# Patient Record
Sex: MALE | Race: WHITE | NOT HISPANIC OR LATINO | Employment: FULL TIME | URBAN - METROPOLITAN AREA
[De-identification: names, ages, dates, MRNs, and addresses within clinical notes are randomized per-mention and may not be internally consistent; named-entity substitution may affect disease eponyms.]

---

## 2023-03-11 LAB
ALANINE AMINOTRANSFERASE (SGPT) (U/L) IN SER/PLAS: 26 U/L (ref 10–52)
ALBUMIN (G/DL) IN SER/PLAS: 4.4 G/DL (ref 3.4–5)
ALKALINE PHOSPHATASE (U/L) IN SER/PLAS: 38 U/L (ref 33–120)
ANION GAP IN SER/PLAS: 11 MMOL/L (ref 10–20)
ASPARTATE AMINOTRANSFERASE (SGOT) (U/L) IN SER/PLAS: 23 U/L (ref 9–39)
BILIRUBIN TOTAL (MG/DL) IN SER/PLAS: 0.5 MG/DL (ref 0–1.2)
CALCIUM (MG/DL) IN SER/PLAS: 9.5 MG/DL (ref 8.6–10.3)
CARBON DIOXIDE, TOTAL (MMOL/L) IN SER/PLAS: 30 MMOL/L (ref 21–32)
CHLORIDE (MMOL/L) IN SER/PLAS: 99 MMOL/L (ref 98–107)
CHOLESTEROL (MG/DL) IN SER/PLAS: 169 MG/DL (ref 0–199)
CHOLESTEROL IN HDL (MG/DL) IN SER/PLAS: 54.9 MG/DL
CHOLESTEROL/HDL RATIO: 3.1
CREATININE (MG/DL) IN SER/PLAS: 0.87 MG/DL (ref 0.5–1.3)
GFR MALE: >90 ML/MIN/1.73M2
GLUCOSE (MG/DL) IN SER/PLAS: 95 MG/DL (ref 74–99)
LDL: 98 MG/DL (ref 0–99)
POTASSIUM (MMOL/L) IN SER/PLAS: 4.3 MMOL/L (ref 3.5–5.3)
PROTEIN TOTAL: 7.4 G/DL (ref 6.4–8.2)
SODIUM (MMOL/L) IN SER/PLAS: 136 MMOL/L (ref 136–145)
TRIGLYCERIDE (MG/DL) IN SER/PLAS: 83 MG/DL (ref 0–149)
UREA NITROGEN (MG/DL) IN SER/PLAS: 13 MG/DL (ref 6–23)
VLDL: 17 MG/DL (ref 0–40)

## 2023-06-10 LAB
ALANINE AMINOTRANSFERASE (SGPT) (U/L) IN SER/PLAS: 23 U/L (ref 10–52)
ALBUMIN (G/DL) IN SER/PLAS: 4.5 G/DL (ref 3.4–5)
ALKALINE PHOSPHATASE (U/L) IN SER/PLAS: 44 U/L (ref 33–120)
ANION GAP IN SER/PLAS: 13 MMOL/L (ref 10–20)
ASPARTATE AMINOTRANSFERASE (SGOT) (U/L) IN SER/PLAS: 21 U/L (ref 9–39)
BILIRUBIN TOTAL (MG/DL) IN SER/PLAS: 0.7 MG/DL (ref 0–1.2)
CALCIUM (MG/DL) IN SER/PLAS: 9.5 MG/DL (ref 8.6–10.3)
CARBON DIOXIDE, TOTAL (MMOL/L) IN SER/PLAS: 29 MMOL/L (ref 21–32)
CHLORIDE (MMOL/L) IN SER/PLAS: 101 MMOL/L (ref 98–107)
CHOLESTEROL (MG/DL) IN SER/PLAS: 187 MG/DL (ref 0–199)
CHOLESTEROL IN HDL (MG/DL) IN SER/PLAS: 70 MG/DL
CHOLESTEROL/HDL RATIO: 2.7
CREATININE (MG/DL) IN SER/PLAS: 0.98 MG/DL (ref 0.5–1.3)
ERYTHROCYTE DISTRIBUTION WIDTH (RATIO) BY AUTOMATED COUNT: 12.5 % (ref 11.5–14.5)
ERYTHROCYTE MEAN CORPUSCULAR HEMOGLOBIN CONCENTRATION (G/DL) BY AUTOMATED: 32.8 G/DL (ref 32–36)
ERYTHROCYTE MEAN CORPUSCULAR VOLUME (FL) BY AUTOMATED COUNT: 98 FL (ref 80–100)
ERYTHROCYTES (10*6/UL) IN BLOOD BY AUTOMATED COUNT: 4.55 X10E12/L (ref 4.5–5.9)
GFR MALE: 88 ML/MIN/1.73M2
GLUCOSE (MG/DL) IN SER/PLAS: 105 MG/DL (ref 74–99)
HEMATOCRIT (%) IN BLOOD BY AUTOMATED COUNT: 44.8 % (ref 41–52)
HEMOGLOBIN (G/DL) IN BLOOD: 14.7 G/DL (ref 13.5–17.5)
LDL: 106 MG/DL (ref 0–99)
LEUKOCYTES (10*3/UL) IN BLOOD BY AUTOMATED COUNT: 7.1 X10E9/L (ref 4.4–11.3)
PLATELETS (10*3/UL) IN BLOOD AUTOMATED COUNT: 353 X10E9/L (ref 150–450)
POTASSIUM (MMOL/L) IN SER/PLAS: 4.5 MMOL/L (ref 3.5–5.3)
PROSTATE SPECIFIC AG (NG/ML) IN SER/PLAS: 0.24 NG/ML (ref 0–4)
PROTEIN TOTAL: 7.4 G/DL (ref 6.4–8.2)
SODIUM (MMOL/L) IN SER/PLAS: 138 MMOL/L (ref 136–145)
TRIGLYCERIDE (MG/DL) IN SER/PLAS: 55 MG/DL (ref 0–149)
UREA NITROGEN (MG/DL) IN SER/PLAS: 19 MG/DL (ref 6–23)
VLDL: 11 MG/DL (ref 0–40)

## 2023-10-21 DIAGNOSIS — E78.5 HYPERLIPIDEMIA, UNSPECIFIED HYPERLIPIDEMIA TYPE: Primary | ICD-10-CM

## 2023-10-21 DIAGNOSIS — I50.9 HEART FAILURE, UNSPECIFIED (MULTI): ICD-10-CM

## 2023-10-23 RX ORDER — PRAVASTATIN SODIUM 20 MG/1
20 TABLET ORAL DAILY
Qty: 90 TABLET | Refills: 1 | Status: SHIPPED | OUTPATIENT
Start: 2023-10-23 | End: 2024-04-01

## 2023-11-17 ENCOUNTER — TELEPHONE (OUTPATIENT)
Dept: CARDIOLOGY | Facility: CLINIC | Age: 58
End: 2023-11-17
Payer: COMMERCIAL

## 2023-11-17 RX ORDER — FLECAINIDE ACETATE 100 MG/1
100 TABLET ORAL 2 TIMES DAILY
Qty: 180 TABLET | Refills: 3 | Status: SHIPPED | OUTPATIENT
Start: 2023-11-17 | End: 2023-12-07 | Stop reason: SDUPTHER

## 2023-11-17 NOTE — TELEPHONE ENCOUNTER
Rx Refill Request Telephone Encounter    Name:  Usman Cota  :  [id number]  Medication Name: flecainide  Specific Pharmacy location:  [facility]  Date of last appointment:  23  Date of next appointment:  24  Best number to reach patient:  [phone]

## 2023-12-07 ENCOUNTER — TELEPHONE (OUTPATIENT)
Dept: CARDIOLOGY | Facility: CLINIC | Age: 58
End: 2023-12-07
Payer: COMMERCIAL

## 2023-12-07 DIAGNOSIS — I50.9 HEART FAILURE, UNSPECIFIED (MULTI): ICD-10-CM

## 2023-12-08 RX ORDER — FLECAINIDE ACETATE 100 MG/1
100 TABLET ORAL 2 TIMES DAILY
Qty: 180 TABLET | Refills: 1 | Status: SHIPPED | OUTPATIENT
Start: 2023-12-08

## 2024-01-20 DIAGNOSIS — I10 ESSENTIAL (PRIMARY) HYPERTENSION: ICD-10-CM

## 2024-01-22 RX ORDER — METOPROLOL TARTRATE 50 MG/1
TABLET ORAL
Qty: 315 TABLET | Refills: 3 | Status: SHIPPED | OUTPATIENT
Start: 2024-01-22

## 2024-01-22 NOTE — TELEPHONE ENCOUNTER
Received pharmacy refill request for metoprolol tartrate. Order placed and sent to physician for signature.

## 2024-01-24 PROBLEM — I45.10 RIGHT BUNDLE BRANCH BLOCK (RBBB): Status: ACTIVE | Noted: 2024-01-24

## 2024-01-24 PROBLEM — I48.0 PAROXYSMAL ATRIAL FIBRILLATION (MULTI): Status: ACTIVE | Noted: 2024-01-24

## 2024-01-24 PROBLEM — I50.9 CONGESTIVE HEART FAILURE (MULTI): Status: ACTIVE | Noted: 2024-01-24

## 2024-01-24 PROBLEM — R00.0 SINUS TACHYCARDIA: Status: ACTIVE | Noted: 2024-01-24

## 2024-01-24 PROBLEM — N52.9 ERECTILE DYSFUNCTION: Status: ACTIVE | Noted: 2024-01-24

## 2024-01-24 PROBLEM — E66.3 OVERWEIGHT WITH BODY MASS INDEX (BMI) OF 27 TO 27.9 IN ADULT: Status: ACTIVE | Noted: 2024-01-24

## 2024-01-24 PROBLEM — E78.2 MIXED HYPERLIPIDEMIA: Status: ACTIVE | Noted: 2024-01-24

## 2024-01-24 PROBLEM — E78.1 ESSENTIAL HYPERTRIGLYCERIDEMIA: Status: ACTIVE | Noted: 2024-01-24

## 2024-01-24 RX ORDER — GEMFIBROZIL 600 MG/1
600 TABLET, FILM COATED ORAL DAILY
COMMUNITY
End: 2024-04-01

## 2024-01-24 RX ORDER — LISINOPRIL 10 MG/1
10 TABLET ORAL DAILY
COMMUNITY
End: 2024-04-01

## 2024-01-24 RX ORDER — DILTIAZEM HYDROCHLORIDE 360 MG/1
360 CAPSULE, EXTENDED RELEASE ORAL DAILY
Qty: 90 CAPSULE | Refills: 3 | Status: SHIPPED | OUTPATIENT
Start: 2024-01-24

## 2024-01-24 RX ORDER — DILTIAZEM HYDROCHLORIDE 360 MG/1
360 CAPSULE, EXTENDED RELEASE ORAL DAILY
COMMUNITY
Start: 2023-10-22 | End: 2024-01-24 | Stop reason: SDUPTHER

## 2024-02-01 ENCOUNTER — OFFICE VISIT (OUTPATIENT)
Dept: ORTHOPEDIC SURGERY | Facility: CLINIC | Age: 59
End: 2024-02-01
Payer: COMMERCIAL

## 2024-02-01 ENCOUNTER — HOSPITAL ENCOUNTER (OUTPATIENT)
Dept: RADIOLOGY | Facility: CLINIC | Age: 59
Discharge: HOME | End: 2024-02-01
Payer: COMMERCIAL

## 2024-02-01 DIAGNOSIS — M25.512 BILATERAL SHOULDER PAIN, UNSPECIFIED CHRONICITY: ICD-10-CM

## 2024-02-01 DIAGNOSIS — M25.511 BILATERAL SHOULDER PAIN, UNSPECIFIED CHRONICITY: ICD-10-CM

## 2024-02-01 PROCEDURE — 99213 OFFICE O/P EST LOW 20 MIN: CPT | Performed by: ORTHOPAEDIC SURGERY

## 2024-02-01 PROCEDURE — 20610 DRAIN/INJ JOINT/BURSA W/O US: CPT | Mod: LT | Performed by: ORTHOPAEDIC SURGERY

## 2024-02-01 PROCEDURE — 2500000005 HC RX 250 GENERAL PHARMACY W/O HCPCS: Performed by: ORTHOPAEDIC SURGERY

## 2024-02-01 PROCEDURE — 73030 X-RAY EXAM OF SHOULDER: CPT | Mod: 50

## 2024-02-01 PROCEDURE — 73030 X-RAY EXAM OF SHOULDER: CPT | Mod: BILATERAL PROCEDURE | Performed by: ORTHOPAEDIC SURGERY

## 2024-02-01 PROCEDURE — 99203 OFFICE O/P NEW LOW 30 MIN: CPT | Performed by: ORTHOPAEDIC SURGERY

## 2024-02-01 PROCEDURE — 2500000004 HC RX 250 GENERAL PHARMACY W/ HCPCS (ALT 636 FOR OP/ED): Performed by: ORTHOPAEDIC SURGERY

## 2024-02-01 RX ORDER — TRIAMCINOLONE ACETONIDE 40 MG/ML
40 INJECTION, SUSPENSION INTRA-ARTICULAR; INTRAMUSCULAR
Status: COMPLETED | OUTPATIENT
Start: 2024-02-01 | End: 2024-02-01

## 2024-02-01 RX ORDER — LIDOCAINE HYDROCHLORIDE 10 MG/ML
2 INJECTION INFILTRATION; PERINEURAL
Status: COMPLETED | OUTPATIENT
Start: 2024-02-01 | End: 2024-02-01

## 2024-02-01 RX ORDER — MELOXICAM 15 MG/1
15 TABLET ORAL DAILY
Qty: 30 TABLET | Refills: 0 | Status: SHIPPED | OUTPATIENT
Start: 2024-02-01 | End: 2024-03-02

## 2024-02-01 RX ADMIN — TRIAMCINOLONE ACETONIDE 40 MG: 40 INJECTION, SUSPENSION INTRA-ARTICULAR; INTRAMUSCULAR at 16:41

## 2024-02-01 RX ADMIN — LIDOCAINE HYDROCHLORIDE 2 ML: 10 INJECTION, SOLUTION INFILTRATION; PERINEURAL at 16:41

## 2024-02-01 NOTE — PROGRESS NOTES
History of Present Illness:  Patient presents today endorsing bilateral shoulder pain.  The pain is worse with overhead activity and tends to wake the patient at night.  The patient denies a traumatic injury.  The pain is sharp in nature, localizes lateral and deep.  Better with rest.    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  He is very physically active and outside of work and enjoys weightlifting.  States that bench press and  press do bother him quite a bit.    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  He has Afib but is currently managing with rate control, no blood thinners.    Past Medical History:   Diagnosis Date    Abdominal distension (gaseous)     Abdominal bloating    Body mass index (BMI) 26.0-26.9, adult 01/27/2022    BMI 26.0-26.9,adult    Overweight     Overweight    Personal history of other specified conditions     History of diarrhea    Personal history of other specified conditions     History of disease    Personal history of other specified conditions     History of chest pain    Right upper quadrant pain     Chronic RUQ pain     Past Surgical History:   Procedure Laterality Date    OTHER SURGICAL HISTORY  01/18/2022    Dental surgery       Current Outpatient Medications:     dilTIAZem CD (Cardizem CD) 360 mg 24 hr capsule, TAKE 1 CAPSULE DAILY, Disp: 90 capsule, Rfl: 3    flecainide (Tambocor) 100 mg tablet, Take 1 tablet (100 mg) by mouth 2 times a day., Disp: 180 tablet, Rfl: 1    gemfibrozil (Lopid) 600 mg tablet, Take 1 tablet (600 mg) by mouth once daily., Disp: , Rfl:     lisinopril 10 mg tablet, Take 1 tablet (10 mg) by mouth once daily., Disp: , Rfl:     metoprolol tartrate (Lopressor) 50 mg tablet, TAKE 1 TABLET IN THE AM, 2 TABLETS IN THE PM, Disp: 315 tablet, Rfl: 3    pravastatin (Pravachol) 20 mg tablet, TAKE 1 TABLET BY MOUTH EVERY DAY, Disp: 90 tablet, Rfl:  1    Review of Systems   GENERAL: Negative for malaise, significant weight loss, fever  MUSCULOSKELETAL: see HPI  NEURO:  Negative    Physical Examination:  Bilateral Shoulder:    Skin healthy to gross inspection  No ecchymosis, no swelling, no gross atrophy  No tenderness to palpation over acromioclavicular joint  No tenderness to palpation over biceps tendon  No tenderness to palpation over the cervical spine     Range of motion:  Full forward flexion  Full external rotation  IR to thoracic spine, symmetric to contralateral side  Strength:  4+/5 Resisted elevation  5/5 Resisted external rotation    Negative lift off test   Negative Spurling´s test  Positive Neer and Hawkin´s test  (+) Obriens test  Neurovascular exam normal distally    Imaging:  Radiographs demonstrate healthy joint spaces with no evidence of significant degenerative changes or fractures.  Mild left humeral head depression, could be angle of radiograph.  Right shoulder there is a small inferior glenohumeral osteophyte that could represent loose body.  Mild AC arthrosis bilaterally.    Assessment:  Patient with bilateral shoulder pain, impingement versus rotator cuff pathology , likely a component of SLAP tear as well    Plan:  We discussed external impingement - reviewing acromial morphology and rotator cuff pathology.  The possibility of a partial or full-thickness or partial rotator cuff tear was discussed.  We discussed non-operative treatments (physical therapy, NSAIDs/risks, corticosteroid injections, and activity medication) and operative treatments (shoulder arthroscopy, acromioplasty, associated interventions, risks and benefits).  The patient elected for CSI to bilateral shoulder today for impingement.  We also discussed possible MRI in the future if he fails to improve after steroid injections for evaluation of soft tissue structures such as rotator cuff and labrum.    Wes Echavarria PA-C     In a face to face encounter, I evaluated  the patient and performed a physical examination, discussed pertinent diagnostic studies if indicated and discussed diagnosis and management strategies with both the patient and physician assistant / nurse practitioner.  I reviewed the PA/NP's note and agree with the documented findings and plan of care.    L Inj/Asp: L subacromial bursa on 2/1/2024 4:41 PM  Indications: pain  Details: 22 G needle, posterior approach  Medications: 2 mL lidocaine 10 mg/mL (1 %); 40 mg triamcinolone acetonide 40 mg/mL  Outcome: tolerated well, no immediate complications  Procedure, treatment alternatives, risks and benefits explained, specific risks discussed. Consent was given by the patient. Immediately prior to procedure a time out was called to verify the correct patient, procedure, equipment, support staff and site/side marked as required. Patient was prepped and draped in the usual sterile fashion.         MRI if he fails to improve.    Darshan Rosas MD

## 2024-02-08 ENCOUNTER — OFFICE VISIT (OUTPATIENT)
Dept: CARDIOLOGY | Facility: CLINIC | Age: 59
End: 2024-02-08
Payer: COMMERCIAL

## 2024-02-08 VITALS
HEART RATE: 67 BPM | SYSTOLIC BLOOD PRESSURE: 138 MMHG | WEIGHT: 196 LBS | HEIGHT: 72 IN | BODY MASS INDEX: 26.55 KG/M2 | DIASTOLIC BLOOD PRESSURE: 80 MMHG

## 2024-02-08 DIAGNOSIS — I45.10 RIGHT BUNDLE BRANCH BLOCK (RBBB): ICD-10-CM

## 2024-02-08 DIAGNOSIS — I10 ESSENTIAL HYPERTENSION: ICD-10-CM

## 2024-02-08 DIAGNOSIS — E78.2 MIXED HYPERLIPIDEMIA: ICD-10-CM

## 2024-02-08 DIAGNOSIS — E66.3 OVERWEIGHT (BMI 25.0-29.9): ICD-10-CM

## 2024-02-08 DIAGNOSIS — I50.9 CONGESTIVE HEART FAILURE, UNSPECIFIED HF CHRONICITY, UNSPECIFIED HEART FAILURE TYPE (MULTI): ICD-10-CM

## 2024-02-08 DIAGNOSIS — I48.0 PAROXYSMAL ATRIAL FIBRILLATION (MULTI): ICD-10-CM

## 2024-02-08 PROCEDURE — 99214 OFFICE O/P EST MOD 30 MIN: CPT | Performed by: INTERNAL MEDICINE

## 2024-02-08 PROCEDURE — 3075F SYST BP GE 130 - 139MM HG: CPT | Performed by: INTERNAL MEDICINE

## 2024-02-08 PROCEDURE — 3079F DIAST BP 80-89 MM HG: CPT | Performed by: INTERNAL MEDICINE

## 2024-02-08 ASSESSMENT — ENCOUNTER SYMPTOMS
CARDIOVASCULAR NEGATIVE: 1
CONSTITUTIONAL NEGATIVE: 1
RESPIRATORY NEGATIVE: 1
NEUROLOGICAL NEGATIVE: 1

## 2024-02-08 ASSESSMENT — PATIENT HEALTH QUESTIONNAIRE - PHQ9
2. FEELING DOWN, DEPRESSED OR HOPELESS: NOT AT ALL
SUM OF ALL RESPONSES TO PHQ9 QUESTIONS 1 AND 2: 0
1. LITTLE INTEREST OR PLEASURE IN DOING THINGS: NOT AT ALL

## 2024-02-08 NOTE — PROGRESS NOTES
[facility] OFFICE VISIT      CHIEF COMPLAINT  Chief Complaint   Patient presents with    Follow-up        HISTORY OF PRESENT ILLNESS  Usman Cota is a 58 y.o. year old male patient with a history of hypertension, paroxysmal atrial fibrillation and a chronic right bundle branch block who has continued to maintain sinus rhythm on flecainide.  Last echo shows normal LV function with no gross valvular abnormalities.  He had moderately elevated triglyceride which is significantly improved and is recent lipid profile from June 2023 which shows total cholesterol is 187, HDL is 70, LDL is 106 and triglyceride is 57.  He has continued to stay active with no significant limitations    ASSESSMENT AND PLAN  1.  Paroxysmal A-fib: Continues to maintain sinus rhythm with no further recurrences of A-fib.  He is on low-dose aspirin.  We will consider reevaluation of his flecainide dosing when he returns for follow-up in 6 months  2.  Hypertension: Blood pressure is well-controlled current medications which are continued.  3.  Chronic right bundle branch block: This appears to be relatively stable and asymptomatic.  4.  Dyslipidemia: With improved lipid profile as noted above, continue with current lipid-lowering therapy.    Problem List Items Addressed This Visit             ICD-10-CM    Essential hypertension I10    Mixed hyperlipidemia E78.2    Paroxysmal atrial fibrillation (CMS/HCC) I48.0    Right bundle branch block (RBBB) I45.10    Congestive heart failure (CMS/HCC) I50.9    Overweight (BMI 25.0-29.9) E66.3       Recent Cardiovascular Testing:    Echo-  Stress-  Cath-  Carotid Ultrasound-    Past Medical History  Past Medical History:   Diagnosis Date    Abdominal distension (gaseous)     Abdominal bloating    Body mass index (BMI) 26.0-26.9, adult 01/27/2022    BMI 26.0-26.9,adult    Overweight     Overweight    Personal history of other specified conditions     History of diarrhea    Personal history of other  "specified conditions     History of disease    Personal history of other specified conditions     History of chest pain    Right upper quadrant pain     Chronic RUQ pain       Social History  Social History     Tobacco Use    Smoking status: Not on file    Smokeless tobacco: Not on file   Substance Use Topics    Alcohol use: Not on file    Drug use: Not on file       Family History     Family History   Problem Relation Name Age of Onset    Pancreatic cancer Father      Stroke Maternal Grandfather      Heart attack Paternal Grandfather          Allergies:  No Known Allergies     Outpatient Medications:  Current Outpatient Medications   Medication Instructions    dilTIAZem CD (CARDIZEM CD) 360 mg, oral, Daily    flecainide (TAMBOCOR) 100 mg, oral, 2 times daily    gemfibrozil (LOPID) 600 mg, oral, Daily    lisinopril 10 mg, oral, Daily    meloxicam (MOBIC) 15 mg, oral, Daily    metoprolol tartrate (Lopressor) 50 mg tablet TAKE 1 TABLET IN THE AM, 2 TABLETS IN THE PM    pravastatin (PRAVACHOL) 20 mg, oral, Daily        Recent Lab Results:    CBC:   Lab Results   Component Value Date    WBC 7.1 06/10/2023    RBC 4.55 06/10/2023    HGB 14.7 06/10/2023    HCT 44.8 06/10/2023     06/10/2023        CMP:    Lab Results   Component Value Date     06/10/2023    K 4.5 06/10/2023     06/10/2023    CO2 29 06/10/2023    BUN 19 06/10/2023    CREATININE 0.98 06/10/2023    GLUCOSE 105 (H) 06/10/2023    CALCIUM 9.5 06/10/2023       Magnesium:    No results found for: \"MG\"    Lipid Profile:    Lab Results   Component Value Date    TRIG 55 06/10/2023    HDL 70.0 06/10/2023       TSH:    Lab Results   Component Value Date    TSH 2.61 02/08/2022       BNP:   No results found for: \"BNP\"     PT/INR:    No results found for: \"PROTIME\", \"INR\"    HgBA1c:    No results found for: \"HGBA1C\"    BMP:  Lab Results   Component Value Date     06/10/2023     03/11/2023     02/08/2022    K 4.5 06/10/2023    K 4.3 " "03/11/2023    K 4.1 02/08/2022     06/10/2023    CL 99 03/11/2023     02/08/2022    CO2 29 06/10/2023    CO2 30 03/11/2023    CO2 28 02/08/2022    BUN 19 06/10/2023    BUN 13 03/11/2023    BUN 12 02/08/2022    CREATININE 0.98 06/10/2023    CREATININE 0.87 03/11/2023    CREATININE 0.76 02/08/2022       CBC:  Lab Results   Component Value Date    WBC 7.1 06/10/2023    WBC 6.8 02/08/2022    WBC 7.4 05/21/2019    RBC 4.55 06/10/2023    RBC 4.66 02/08/2022    RBC 4.19 (L) 05/21/2019    HGB 14.7 06/10/2023    HGB 14.5 02/08/2022    HGB 13.9 05/21/2019    HCT 44.8 06/10/2023    HCT 46.2 02/08/2022    HCT 41.9 05/21/2019    MCV 98 06/10/2023    MCV 99 02/08/2022     05/21/2019    MCHC 32.8 06/10/2023    MCHC 31.4 (L) 02/08/2022    MCHC 33.2 05/21/2019    RDW 12.5 06/10/2023    RDW 12.8 02/08/2022    RDW 12.3 05/21/2019     06/10/2023     02/08/2022     05/21/2019       Cardiac Enzymes:    No results found for: \"TROPHS\"    Hepatic Function Panel:    Lab Results   Component Value Date    ALKPHOS 44 06/10/2023    ALT 23 06/10/2023    AST 21 06/10/2023    PROT 7.4 06/10/2023    BILITOT 0.7 06/10/2023    BILIDIR 0.1 04/16/2021         REVIEW OF SYSTEMS  Review of Systems   Constitutional: Negative.   Cardiovascular: Negative.    Respiratory: Negative.     Neurological: Negative.        VITALS  Vitals:    02/08/24 1650   BP: 138/80   Pulse: 67     Wt Readings from Last 4 Encounters:   02/08/24 88.9 kg (196 lb)   04/18/23 89.6 kg (197 lb 8 oz)   01/12/23 86.6 kg (191 lb)   05/26/22 84.4 kg (186 lb)       PHYSICAL EXAM  Constitutional:       Appearance: Healthy appearance.      Comments: overweight   Neck:      Vascular: JVD normal.   Pulmonary:      Effort: Pulmonary effort is normal.      Breath sounds: Normal breath sounds.   Cardiovascular:      Normal rate. Regular rhythm.      Murmurs: There is no murmur.   Edema:     Peripheral edema absent.   Skin:     General: Skin is warm and dry. "   Neurological:      Mental Status: Alert and oriented to person, place and time.         Scribe Attestation  By signing my name below, Soledad THOMPSON LPN  , Scribe   attest that this documentation has been prepared under the direction and in the presence of Diomedes Giles MD.

## 2024-03-09 ENCOUNTER — LAB (OUTPATIENT)
Dept: LAB | Facility: LAB | Age: 59
End: 2024-03-09
Payer: COMMERCIAL

## 2024-03-09 DIAGNOSIS — I10 ESSENTIAL HYPERTENSION: ICD-10-CM

## 2024-03-09 DIAGNOSIS — I50.9 CONGESTIVE HEART FAILURE, UNSPECIFIED HF CHRONICITY, UNSPECIFIED HEART FAILURE TYPE (MULTI): ICD-10-CM

## 2024-03-09 DIAGNOSIS — E78.2 MIXED HYPERLIPIDEMIA: ICD-10-CM

## 2024-03-09 LAB
ALBUMIN SERPL BCP-MCNC: 4.3 G/DL (ref 3.4–5)
ALP SERPL-CCNC: 33 U/L (ref 33–136)
ALT SERPL W P-5'-P-CCNC: 16 U/L (ref 10–52)
ANION GAP SERPL CALC-SCNC: 11 MMOL/L (ref 10–20)
AST SERPL W P-5'-P-CCNC: 15 U/L (ref 9–39)
BILIRUB SERPL-MCNC: 0.6 MG/DL (ref 0–1.2)
BNP SERPL-MCNC: 59 PG/ML (ref 0–99)
BUN SERPL-MCNC: 13 MG/DL (ref 6–23)
CALCIUM SERPL-MCNC: 9.4 MG/DL (ref 8.6–10.3)
CHLORIDE SERPL-SCNC: 102 MMOL/L (ref 98–107)
CHOLEST SERPL-MCNC: 195 MG/DL (ref 0–199)
CHOLESTEROL/HDL RATIO: 2.6
CO2 SERPL-SCNC: 29 MMOL/L (ref 21–32)
CREAT SERPL-MCNC: 0.95 MG/DL (ref 0.5–1.3)
EGFRCR SERPLBLD CKD-EPI 2021: >90 ML/MIN/1.73M*2
GLUCOSE SERPL-MCNC: 93 MG/DL (ref 74–99)
HDLC SERPL-MCNC: 74.1 MG/DL
LDLC SERPL CALC-MCNC: 100 MG/DL
NON HDL CHOLESTEROL: 121 MG/DL (ref 0–149)
POTASSIUM SERPL-SCNC: 4.6 MMOL/L (ref 3.5–5.3)
PROT SERPL-MCNC: 6.8 G/DL (ref 6.4–8.2)
SODIUM SERPL-SCNC: 137 MMOL/L (ref 136–145)
TRIGL SERPL-MCNC: 103 MG/DL (ref 0–149)
VLDL: 21 MG/DL (ref 0–40)

## 2024-03-09 PROCEDURE — 36415 COLL VENOUS BLD VENIPUNCTURE: CPT

## 2024-03-09 PROCEDURE — 80053 COMPREHEN METABOLIC PANEL: CPT

## 2024-03-09 PROCEDURE — 80061 LIPID PANEL: CPT

## 2024-03-09 PROCEDURE — 83880 ASSAY OF NATRIURETIC PEPTIDE: CPT

## 2024-03-14 ENCOUNTER — OFFICE VISIT (OUTPATIENT)
Dept: ORTHOPEDIC SURGERY | Facility: CLINIC | Age: 59
End: 2024-03-14
Payer: COMMERCIAL

## 2024-03-14 DIAGNOSIS — M25.511 BILATERAL SHOULDER PAIN, UNSPECIFIED CHRONICITY: ICD-10-CM

## 2024-03-14 DIAGNOSIS — M25.512 BILATERAL SHOULDER PAIN, UNSPECIFIED CHRONICITY: ICD-10-CM

## 2024-03-14 DIAGNOSIS — S46.219A BICEPS TENDON TEAR: ICD-10-CM

## 2024-03-14 PROCEDURE — 99213 OFFICE O/P EST LOW 20 MIN: CPT | Performed by: ORTHOPAEDIC SURGERY

## 2024-03-14 NOTE — PROGRESS NOTES
History of Present Illness:   Patient known to me with bilateral shoulder pain and recently had some injections done.  He was doing well but when doing some lifting recently he noted some significant pain felt a pop in the shoulder and has some deformity now.    He states the shoulder pain is somewhat improved has some mild cramping pain in his biceps.    Review of Systems   GENERAL: Negative for malaise, significant weight loss, fever  MUSCULOSKELETAL: see HPI  NEURO:  Negative    Physical Examination:  Right Shoulder:    Skin healthy to gross inspection  No ecchymosis, no swelling, no gross atrophy  No tenderness to palpation over acromioclavicular joint  No tenderness to palpation over biceps tendon  No tenderness to palpation over the cervical spine     ROM:  Full forward flexion  Full external rotation  IR to thoracic spine, symmetric to contralateral side  Strength:  5-/5 Resisted elevation  5/5 Resisted external rotation  Negative lift off test   Negative Spurling´s test  Positive Neer and Hawking´s test  Neurovascular exam normal distally      Imaging:  Deferred    Assessment:   Patient with likely long head of biceps rupture in the setting of concern for impingement/rotator cuff pathology    Plan:  We discussed with the patient based on the deformity and the recent injury recommend MRI done efficiently for further evaluation.  We discussed surgical versus nonsurgical management for a long head of biceps tears and the patient is certainly amenable to nonsurgical management however due to concern for concomitant rotator cuff pathology that might change treatment plan.  Will contact him with the results of the study.

## 2024-03-20 ENCOUNTER — HOSPITAL ENCOUNTER (OUTPATIENT)
Dept: RADIOLOGY | Facility: CLINIC | Age: 59
Discharge: HOME | End: 2024-03-20
Payer: COMMERCIAL

## 2024-03-20 DIAGNOSIS — M25.512 BILATERAL SHOULDER PAIN, UNSPECIFIED CHRONICITY: ICD-10-CM

## 2024-03-20 DIAGNOSIS — S46.219A BICEPS TENDON TEAR: ICD-10-CM

## 2024-03-20 DIAGNOSIS — M25.511 BILATERAL SHOULDER PAIN, UNSPECIFIED CHRONICITY: ICD-10-CM

## 2024-03-20 PROCEDURE — 73221 MRI JOINT UPR EXTREM W/O DYE: CPT | Mod: RT

## 2024-03-20 PROCEDURE — 73221 MRI JOINT UPR EXTREM W/O DYE: CPT | Mod: RIGHT SIDE | Performed by: RADIOLOGY

## 2024-03-21 ENCOUNTER — APPOINTMENT (OUTPATIENT)
Dept: PRIMARY CARE | Facility: CLINIC | Age: 59
End: 2024-03-21
Payer: COMMERCIAL

## 2024-03-30 DIAGNOSIS — I10 ESSENTIAL (PRIMARY) HYPERTENSION: ICD-10-CM

## 2024-03-30 DIAGNOSIS — E78.5 HYPERLIPIDEMIA, UNSPECIFIED HYPERLIPIDEMIA TYPE: ICD-10-CM

## 2024-03-30 DIAGNOSIS — E78.2 MIXED HYPERLIPIDEMIA: ICD-10-CM

## 2024-04-01 RX ORDER — GEMFIBROZIL 600 MG/1
600 TABLET, FILM COATED ORAL DAILY
Qty: 90 TABLET | Refills: 1 | Status: SHIPPED | OUTPATIENT
Start: 2024-04-01

## 2024-04-01 RX ORDER — PRAVASTATIN SODIUM 20 MG/1
20 TABLET ORAL DAILY
Qty: 90 TABLET | Refills: 1 | Status: SHIPPED | OUTPATIENT
Start: 2024-04-01

## 2024-04-01 RX ORDER — LISINOPRIL 10 MG/1
10 TABLET ORAL DAILY
Qty: 90 TABLET | Refills: 1 | Status: SHIPPED | OUTPATIENT
Start: 2024-04-01

## 2024-07-01 ENCOUNTER — APPOINTMENT (OUTPATIENT)
Dept: PRIMARY CARE | Facility: CLINIC | Age: 59
End: 2024-07-01
Payer: COMMERCIAL

## 2024-07-01 VITALS
HEART RATE: 65 BPM | HEIGHT: 71 IN | DIASTOLIC BLOOD PRESSURE: 73 MMHG | WEIGHT: 190 LBS | BODY MASS INDEX: 26.6 KG/M2 | TEMPERATURE: 97.9 F | SYSTOLIC BLOOD PRESSURE: 119 MMHG

## 2024-07-01 DIAGNOSIS — Z00.00 ANNUAL PHYSICAL EXAM: ICD-10-CM

## 2024-07-01 DIAGNOSIS — E78.2 MIXED HYPERLIPIDEMIA: ICD-10-CM

## 2024-07-01 DIAGNOSIS — Z78.9 NEVER SMOKED CIGARETTES: ICD-10-CM

## 2024-07-01 DIAGNOSIS — I10 ESSENTIAL HYPERTENSION: ICD-10-CM

## 2024-07-01 DIAGNOSIS — E66.3 OVERWEIGHT WITH BODY MASS INDEX (BMI) OF 26 TO 26.9 IN ADULT: ICD-10-CM

## 2024-07-01 DIAGNOSIS — Z12.5 PROSTATE CANCER SCREENING: ICD-10-CM

## 2024-07-01 PROCEDURE — 3074F SYST BP LT 130 MM HG: CPT | Performed by: FAMILY MEDICINE

## 2024-07-01 PROCEDURE — 1036F TOBACCO NON-USER: CPT | Performed by: FAMILY MEDICINE

## 2024-07-01 PROCEDURE — 3008F BODY MASS INDEX DOCD: CPT | Performed by: FAMILY MEDICINE

## 2024-07-01 PROCEDURE — 3078F DIAST BP <80 MM HG: CPT | Performed by: FAMILY MEDICINE

## 2024-07-01 PROCEDURE — 99396 PREV VISIT EST AGE 40-64: CPT | Performed by: FAMILY MEDICINE

## 2024-07-01 ASSESSMENT — ENCOUNTER SYMPTOMS
RESPIRATORY NEGATIVE: 1
GASTROINTESTINAL NEGATIVE: 1
PALPITATIONS: 1
ALLERGIC/IMMUNOLOGIC NEGATIVE: 1
HEMATOLOGIC/LYMPHATIC NEGATIVE: 1
EYES NEGATIVE: 1
ENDOCRINE NEGATIVE: 1
MUSCULOSKELETAL NEGATIVE: 1
PSYCHIATRIC NEGATIVE: 1
CONSTITUTIONAL NEGATIVE: 1

## 2024-07-01 ASSESSMENT — PATIENT HEALTH QUESTIONNAIRE - PHQ9
2. FEELING DOWN, DEPRESSED OR HOPELESS: NOT AT ALL
1. LITTLE INTEREST OR PLEASURE IN DOING THINGS: NOT AT ALL
SUM OF ALL RESPONSES TO PHQ9 QUESTIONS 1 AND 2: 0

## 2024-07-01 NOTE — PROGRESS NOTES
Subjective     Patient ID: Usman Cota is a 58 y.o. male who presents for Annual Exam:    Problem List Items Addressed This Visit    None     Past Medical History:   Diagnosis Date    Abdominal distension (gaseous)     Abdominal bloating    Body mass index (BMI) 26.0-26.9, adult 01/27/2022    BMI 26.0-26.9,adult    Overweight     Overweight    Personal history of other specified conditions     History of diarrhea    Personal history of other specified conditions     History of disease    Personal history of other specified conditions     History of chest pain    Right upper quadrant pain     Chronic RUQ pain      Past Surgical History:   Procedure Laterality Date    OTHER SURGICAL HISTORY  01/18/2022    Dental surgery      Family History   Problem Relation Name Age of Onset    No Known Problems Mother      Pancreatic cancer Father      Stroke Maternal Grandfather      Heart attack Paternal Grandfather        Social History     Socioeconomic History    Marital status:      Spouse name: Not on file    Number of children: Not on file    Years of education: Not on file    Highest education level: Not on file   Occupational History    Not on file   Tobacco Use    Smoking status: Never    Smokeless tobacco: Never   Substance and Sexual Activity    Alcohol use: Yes     Alcohol/week: 14.0 standard drinks of alcohol     Types: 14 Standard drinks or equivalent per week    Drug use: Yes     Types: Marijuana     Comment: 2-3 weekly    Sexual activity: Not on file   Other Topics Concern    Not on file   Social History Narrative    Not on file     Social Determinants of Health     Financial Resource Strain: Not on file   Food Insecurity: Not on file   Transportation Needs: Not on file   Physical Activity: Not on file   Stress: Not on file   Social Connections: Not on file   Intimate Partner Violence: Not on file   Housing Stability: Not on file      Patient has no known allergies.   Current Outpatient Medications    Medication Sig Dispense Refill    dilTIAZem CD (Cardizem CD) 360 mg 24 hr capsule TAKE 1 CAPSULE DAILY 90 capsule 3    flecainide (Tambocor) 100 mg tablet Take 1 tablet (100 mg) by mouth 2 times a day. 180 tablet 1    gemfibrozil (Lopid) 600 mg tablet TAKE 1 TABLET BY MOUTH EVERY DAY 90 tablet 1    lisinopril 10 mg tablet TAKE 1 TABLET BY MOUTH EVERY DAY 90 tablet 1    metoprolol tartrate (Lopressor) 50 mg tablet TAKE 1 TABLET IN THE AM, 2 TABLETS IN THE  tablet 3    pravastatin (Pravachol) 20 mg tablet TAKE 1 TABLET BY MOUTH EVERY DAY 90 tablet 1     No current facility-administered medications for this visit.       Immunization History   Administered Date(s) Administered    Pfizer Purple Cap SARS-CoV-2 03/23/2021, 04/17/2021, 11/23/2021        Review of Systems     Vitals:    07/01/24 1006   BP: 119/73   Pulse: 65   Temp: 36.6 °C (97.9 °F)     Vitals:    07/01/24 1006   Weight: 86.2 kg (190 lb)      Physical Exam     ASSESSMENT/PLAN:         Scribe Attestation  By signing my name below, I, Morena Xiao LPN, Scribe   attest that this documentation has been prepared under the direction and in the presence of Brian Stahl MD.

## 2024-07-01 NOTE — PROGRESS NOTES
Martha Anthony is here for his annual wellness visit.  He states that he has overall been feeling well.  He has no complaints at the present time.  He continues on his medications as noted.  About 2 weeks ago he did notice some palpitations with stress at work.  He had no chest pains or shortness of breath lightheadedness or dizziness.  Cologuard is up-to-date.    Patient ID: Usman Cota is a 58 y.o. male who presents for Annual Exam:    Problem List Items Addressed This Visit       Essential hypertension    Relevant Orders    CBC    Mixed hyperlipidemia    Relevant Orders    CBC    Overweight with body mass index (BMI) of 26 to 26.9 in adult    Relevant Orders    CBC    Annual physical exam    Relevant Orders    CBC    Never smoked cigarettes     Other Visit Diagnoses       Prostate cancer screening        Relevant Orders    Prostate Specific Antigen, Screen           Past Medical History:   Diagnosis Date    Abdominal distension (gaseous)     Abdominal bloating    Body mass index (BMI) 26.0-26.9, adult 01/27/2022    BMI 26.0-26.9,adult    Overweight     Overweight    Personal history of other specified conditions     History of diarrhea    Personal history of other specified conditions     History of disease    Personal history of other specified conditions     History of chest pain    Right upper quadrant pain     Chronic RUQ pain      Past Surgical History:   Procedure Laterality Date    OTHER SURGICAL HISTORY  01/18/2022    Dental surgery      Family History   Problem Relation Name Age of Onset    No Known Problems Mother      Pancreatic cancer Father      Stroke Maternal Grandfather      Heart attack Paternal Grandfather        Social History     Socioeconomic History    Marital status:      Spouse name: Not on file    Number of children: Not on file    Years of education: Not on file    Highest education level: Not on file   Occupational History    Not on file   Tobacco Use    Smoking status: Never     Smokeless tobacco: Never   Substance and Sexual Activity    Alcohol use: Yes     Alcohol/week: 14.0 standard drinks of alcohol     Types: 14 Standard drinks or equivalent per week    Drug use: Yes     Types: Marijuana     Comment: 2-3 weekly    Sexual activity: Not on file   Other Topics Concern    Not on file   Social History Narrative    Not on file     Social Determinants of Health     Financial Resource Strain: Not on file   Food Insecurity: Not on file   Transportation Needs: Not on file   Physical Activity: Not on file   Stress: Not on file   Social Connections: Not on file   Intimate Partner Violence: Not on file   Housing Stability: Not on file      Patient has no known allergies.   Current Outpatient Medications   Medication Sig Dispense Refill    dilTIAZem CD (Cardizem CD) 360 mg 24 hr capsule TAKE 1 CAPSULE DAILY 90 capsule 3    flecainide (Tambocor) 100 mg tablet Take 1 tablet (100 mg) by mouth 2 times a day. 180 tablet 1    lisinopril 10 mg tablet TAKE 1 TABLET BY MOUTH EVERY DAY 90 tablet 1    metoprolol tartrate (Lopressor) 50 mg tablet TAKE 1 TABLET IN THE AM, 2 TABLETS IN THE  tablet 3    pravastatin (Pravachol) 20 mg tablet TAKE 1 TABLET BY MOUTH EVERY DAY 90 tablet 1     No current facility-administered medications for this visit.       Immunization History   Administered Date(s) Administered    Pfizer Purple Cap SARS-CoV-2 03/23/2021, 04/17/2021, 11/23/2021        Review of Systems   Constitutional: Negative.    HENT: Negative.     Eyes: Negative.    Respiratory: Negative.     Cardiovascular:  Positive for palpitations.   Gastrointestinal: Negative.    Endocrine: Negative.    Genitourinary: Negative.    Musculoskeletal: Negative.    Skin: Negative.    Allergic/Immunologic: Negative.    Hematological: Negative.    Psychiatric/Behavioral: Negative.     All other systems reviewed and are negative.       Vitals:    07/01/24 1006   BP: 119/73   Pulse: 65   Temp: 36.6 °C (97.9 °F)     Vitals:  [de-identified] : Pt here today with urinary frequency, urgency and burning with urination. symptoms for 5 days. no temps  no back pain. Followed by hematology for thrombocytosis. history of osteoporosis followed by endo    07/01/24 1006   Weight: 86.2 kg (190 lb)      Physical Exam  Constitutional:       General: He is not in acute distress.     Appearance: Normal appearance.   Neck:      Vascular: No carotid bruit.   Cardiovascular:      Rate and Rhythm: Normal rate and regular rhythm.      Pulses: Normal pulses.      Heart sounds: Normal heart sounds. No murmur heard.     No friction rub. No gallop.   Pulmonary:      Effort: Pulmonary effort is normal. No respiratory distress.      Breath sounds: Normal breath sounds. No wheezing or rales.   Abdominal:      General: Abdomen is flat. There is no distension.      Palpations: Abdomen is soft. There is no mass.      Tenderness: There is no abdominal tenderness. There is no guarding.   Musculoskeletal:      Cervical back: Neck supple.   Neurological:      General: No focal deficit present.      Mental Status: He is alert and oriented to person, place, and time. Mental status is at baseline.          ASSESSMENT/PLAN: Annual wellness visit.  Check PSA and CBC    Hypertension stable.  Continue current meds noted    Hyperlipidemia with cholesterol 195 and .  Continue pravastatin daily.    History of atrial fibrillation.  Follow-up with cardiology this month.  Discussed with cardiology regarding palpitations.    Cologuard up-to-date  Recommended shingles vaccination  Continue current meds  Follow-up in 1 year and call as needed

## 2024-07-17 DIAGNOSIS — I50.9 HEART FAILURE, UNSPECIFIED (MULTI): ICD-10-CM

## 2024-07-17 NOTE — TELEPHONE ENCOUNTER
Received request for prescription refills for patient.   Patient follows with Dr. Diomedes Giles M.D.      Request is for refill  Is patient currently on medication yes    Last OV 2/8/24  Next OV 7/25/24    Pended for signing and sent to provider

## 2024-07-18 RX ORDER — FLECAINIDE ACETATE 100 MG/1
100 TABLET ORAL 2 TIMES DAILY
Qty: 180 TABLET | Refills: 3 | Status: SHIPPED | OUTPATIENT
Start: 2024-07-18

## 2024-07-25 ENCOUNTER — LAB (OUTPATIENT)
Dept: LAB | Facility: LAB | Age: 59
End: 2024-07-25
Payer: COMMERCIAL

## 2024-07-25 ENCOUNTER — APPOINTMENT (OUTPATIENT)
Dept: CARDIOLOGY | Facility: CLINIC | Age: 59
End: 2024-07-25
Payer: COMMERCIAL

## 2024-07-25 VITALS
BODY MASS INDEX: 26.32 KG/M2 | DIASTOLIC BLOOD PRESSURE: 88 MMHG | HEART RATE: 59 BPM | WEIGHT: 188 LBS | HEIGHT: 71 IN | SYSTOLIC BLOOD PRESSURE: 138 MMHG

## 2024-07-25 DIAGNOSIS — Z00.00 ANNUAL PHYSICAL EXAM: ICD-10-CM

## 2024-07-25 DIAGNOSIS — I10 ESSENTIAL HYPERTENSION: ICD-10-CM

## 2024-07-25 DIAGNOSIS — E78.1 ESSENTIAL HYPERTRIGLYCERIDEMIA: ICD-10-CM

## 2024-07-25 DIAGNOSIS — Z12.5 PROSTATE CANCER SCREENING: ICD-10-CM

## 2024-07-25 DIAGNOSIS — I48.0 PAROXYSMAL ATRIAL FIBRILLATION (MULTI): ICD-10-CM

## 2024-07-25 DIAGNOSIS — I50.9 CONGESTIVE HEART FAILURE, UNSPECIFIED HF CHRONICITY, UNSPECIFIED HEART FAILURE TYPE (MULTI): ICD-10-CM

## 2024-07-25 DIAGNOSIS — E78.2 MIXED HYPERLIPIDEMIA: ICD-10-CM

## 2024-07-25 DIAGNOSIS — Z78.9 NEVER SMOKED CIGARETTES: ICD-10-CM

## 2024-07-25 DIAGNOSIS — E66.3 OVERWEIGHT WITH BODY MASS INDEX (BMI) OF 26 TO 26.9 IN ADULT: ICD-10-CM

## 2024-07-25 LAB
ERYTHROCYTE [DISTWIDTH] IN BLOOD BY AUTOMATED COUNT: 12.3 % (ref 11.5–14.5)
HCT VFR BLD AUTO: 43.2 % (ref 41–52)
HGB BLD-MCNC: 14.9 G/DL (ref 13.5–17.5)
MCH RBC QN AUTO: 33.3 PG (ref 26–34)
MCHC RBC AUTO-ENTMCNC: 34.5 G/DL (ref 32–36)
MCV RBC AUTO: 97 FL (ref 80–100)
NRBC BLD-RTO: 0 /100 WBCS (ref 0–0)
PLATELET # BLD AUTO: 357 X10*3/UL (ref 150–450)
PSA SERPL-MCNC: 0.4 NG/ML
RBC # BLD AUTO: 4.47 X10*6/UL (ref 4.5–5.9)
WBC # BLD AUTO: 9.6 X10*3/UL (ref 4.4–11.3)

## 2024-07-25 PROCEDURE — 3008F BODY MASS INDEX DOCD: CPT | Performed by: INTERNAL MEDICINE

## 2024-07-25 PROCEDURE — 36415 COLL VENOUS BLD VENIPUNCTURE: CPT

## 2024-07-25 PROCEDURE — 3075F SYST BP GE 130 - 139MM HG: CPT | Performed by: INTERNAL MEDICINE

## 2024-07-25 PROCEDURE — 99214 OFFICE O/P EST MOD 30 MIN: CPT | Performed by: INTERNAL MEDICINE

## 2024-07-25 PROCEDURE — 84153 ASSAY OF PSA TOTAL: CPT

## 2024-07-25 PROCEDURE — 3079F DIAST BP 80-89 MM HG: CPT | Performed by: INTERNAL MEDICINE

## 2024-07-25 PROCEDURE — 1036F TOBACCO NON-USER: CPT | Performed by: INTERNAL MEDICINE

## 2024-07-25 PROCEDURE — 85027 COMPLETE CBC AUTOMATED: CPT

## 2024-07-25 RX ORDER — NAPROXEN SODIUM 220 MG/1
81 TABLET, FILM COATED ORAL DAILY
Qty: 30 TABLET | Refills: 11 | Status: SHIPPED | OUTPATIENT
Start: 2024-07-25 | End: 2025-07-25

## 2024-07-25 ASSESSMENT — ENCOUNTER SYMPTOMS
PALPITATIONS: 1
NEUROLOGICAL NEGATIVE: 1
RESPIRATORY NEGATIVE: 1
CONSTITUTIONAL NEGATIVE: 1

## 2024-07-25 NOTE — PROGRESS NOTES
CARDIOLOGY OFFICE VISIT      CHIEF COMPLAINT  Chief Complaint   Patient presents with    Follow-up        HISTORY OF PRESENT ILLNESS  Usman Cota is a 58 y.o. year old male patient with a history of hypertension, paroxysmal A-fib and chronic right bundle branch block who is on flecainide for maintenance of sinus rhythm.  Last echo shows normal LV function with no gross valvular abnormalities.  He has noticed some increased episodes of palpitations lately, with episode that lasted about 1 however a couple of days ago.  He did not have any chest pain with this episode.  He denies orthopnea proximal nocturnal dyspnea.  Recent lipid profile from March 2024 shows cholesterol is 195, HDL is 74, LDL is 100 and triglyceride is 103    ASSESSMENT AND PLAN  1.  Paroxysmal A-fib: Patient is currently in sinus rhythm but he seems to have increased episodes of palpitations which may be recurrent A-fib.  Will get a 14-day Zio patch monitoring and in the meantime I encouraged him to start taking low-dose aspirin which he apparently was not taking as prescribed.  Will follow-up based on the results of the Zio patch.  2.  Hypertension: Blood pressure is fairly well-controlled current medications which we will continue.  3.  Chronic right bundle branch block: This is stable and asymptomatic.  4.  Dyslipidemia: With improved lipid profile, continue with current lipid-lowering therapy.    Problem List Items Addressed This Visit             ICD-10-CM    Essential hypertension I10    Essential hypertriglyceridemia E78.1    Paroxysmal atrial fibrillation (Multi) I48.0    Congestive heart failure (Multi) I50.9    Overweight with body mass index (BMI) of 26 to 26.9 in adult E66.3, Z68.26    Never smoked cigarettes Z78.9       Recent Cardiovascular Testing:    Echo-  Stress-  Cath-  Carotid Ultrasound-    Past Medical History  Past Medical History:   Diagnosis Date    Abdominal distension (gaseous)     Abdominal bloating    Body mass index  "(BMI) 26.0-26.9, adult 01/27/2022    BMI 26.0-26.9,adult    Overweight     Overweight    Personal history of other specified conditions     History of diarrhea    Personal history of other specified conditions     History of disease    Personal history of other specified conditions     History of chest pain    Right upper quadrant pain     Chronic RUQ pain       Social History  Social History     Tobacco Use    Smoking status: Never    Smokeless tobacco: Never   Substance Use Topics    Alcohol use: Yes     Alcohol/week: 14.0 standard drinks of alcohol     Types: 14 Standard drinks or equivalent per week    Drug use: Yes     Types: Marijuana     Comment: 2-3 weekly       Family History     Family History   Problem Relation Name Age of Onset    No Known Problems Mother      Pancreatic cancer Father      Stroke Maternal Grandfather      Heart attack Paternal Grandfather          Allergies:  No Known Allergies     Outpatient Medications:  Current Outpatient Medications   Medication Instructions    dilTIAZem CD (CARDIZEM CD) 360 mg, oral, Daily    flecainide (TAMBOCOR) 100 mg, oral, 2 times daily    lisinopril 10 mg, oral, Daily    metoprolol tartrate (Lopressor) 50 mg tablet TAKE 1 TABLET IN THE AM, 2 TABLETS IN THE PM    pravastatin (PRAVACHOL) 20 mg, oral, Daily        Recent Lab Results:    CBC:   Lab Results   Component Value Date    WBC 7.1 06/10/2023    RBC 4.55 06/10/2023    HGB 14.7 06/10/2023    HCT 44.8 06/10/2023     06/10/2023        CMP:    Lab Results   Component Value Date     03/09/2024    K 4.6 03/09/2024     03/09/2024    CO2 29 03/09/2024    BUN 13 03/09/2024    CREATININE 0.95 03/09/2024    GLUCOSE 93 03/09/2024    CALCIUM 9.4 03/09/2024       Magnesium:    No results found for: \"MG\"    Lipid Profile:    Lab Results   Component Value Date    TRIG 103 03/09/2024    HDL 74.1 03/09/2024    LDLCALC 100 (H) 03/09/2024       TSH:    Lab Results   Component Value Date    TSH 2.61 " "02/08/2022       BNP:   Lab Results   Component Value Date    BNP 59 03/09/2024        PT/INR:    No results found for: \"PROTIME\", \"INR\"    HgBA1c:    No results found for: \"HGBA1C\"    BMP:  Lab Results   Component Value Date     03/09/2024     06/10/2023     03/11/2023     02/08/2022    K 4.6 03/09/2024    K 4.5 06/10/2023    K 4.3 03/11/2023    K 4.1 02/08/2022     03/09/2024     06/10/2023    CL 99 03/11/2023     02/08/2022    CO2 29 03/09/2024    CO2 29 06/10/2023    CO2 30 03/11/2023    CO2 28 02/08/2022    BUN 13 03/09/2024    BUN 19 06/10/2023    BUN 13 03/11/2023    BUN 12 02/08/2022    CREATININE 0.95 03/09/2024    CREATININE 0.98 06/10/2023    CREATININE 0.87 03/11/2023    CREATININE 0.76 02/08/2022       CBC:  Lab Results   Component Value Date    WBC 7.1 06/10/2023    WBC 6.8 02/08/2022    WBC 7.4 05/21/2019    RBC 4.55 06/10/2023    RBC 4.66 02/08/2022    RBC 4.19 (L) 05/21/2019    HGB 14.7 06/10/2023    HGB 14.5 02/08/2022    HGB 13.9 05/21/2019    HCT 44.8 06/10/2023    HCT 46.2 02/08/2022    HCT 41.9 05/21/2019    MCV 98 06/10/2023    MCV 99 02/08/2022     05/21/2019    MCHC 32.8 06/10/2023    MCHC 31.4 (L) 02/08/2022    MCHC 33.2 05/21/2019    RDW 12.5 06/10/2023    RDW 12.8 02/08/2022    RDW 12.3 05/21/2019     06/10/2023     02/08/2022     05/21/2019       Cardiac Enzymes:    No results found for: \"TROPHS\"    Hepatic Function Panel:    Lab Results   Component Value Date    ALKPHOS 33 03/09/2024    ALT 16 03/09/2024    AST 15 03/09/2024    PROT 6.8 03/09/2024    BILITOT 0.6 03/09/2024    BILIDIR 0.1 04/16/2021         REVIEW OF SYSTEMS  Review of Systems   Constitutional: Negative.   Cardiovascular:  Positive for palpitations.   Respiratory: Negative.     Neurological: Negative.        VITALS  Vitals:    07/25/24 1608   BP: 148/89   Pulse: 59     Wt Readings from Last 4 Encounters:   07/25/24 85.3 kg (188 lb)   07/01/24 86.2 kg " (190 lb)   03/20/24 86.2 kg (190 lb)   02/08/24 88.9 kg (196 lb)       PHYSICAL EXAM  Constitutional:       Appearance: Healthy appearance.      Comments: overweight   Neck:      Thyroid: Thyroid normal.      Vascular: JVD normal.   Pulmonary:      Effort: Pulmonary effort is normal.      Breath sounds: Normal breath sounds.   Cardiovascular:      Normal rate. Regular rhythm. Normal S1. Normal S2.       Murmurs: There is no murmur.   Edema:     Peripheral edema absent.   Skin:     General: Skin is warm and dry.   Neurological:      General: No focal deficit present.      Mental Status: Alert and oriented to person, place and time.         Scribe Attestation  By signing my name below, I Soledadthania Morel LPN  , Scribe   attest that this documentation has been prepared under the direction and in the presence of Diomedes Giles MD.

## 2024-07-26 ENCOUNTER — TELEPHONE (OUTPATIENT)
Dept: CARDIOLOGY | Facility: CLINIC | Age: 59
End: 2024-07-26
Payer: COMMERCIAL

## 2024-07-26 ENCOUNTER — TELEPHONE (OUTPATIENT)
Dept: PRIMARY CARE | Facility: CLINIC | Age: 59
End: 2024-07-26
Payer: COMMERCIAL

## 2024-07-26 NOTE — TELEPHONE ENCOUNTER
2 WEEK ZIO PATCH 43895/25234  no auth required per [place]/[employer] online look up code tool

## 2024-07-29 ENCOUNTER — TELEPHONE (OUTPATIENT)
Dept: CARDIOLOGY | Facility: CLINIC | Age: 59
End: 2024-07-29
Payer: COMMERCIAL

## 2024-07-29 NOTE — TELEPHONE ENCOUNTER
Called and left a VM to get 2 week Zio patch scheduled and a 2 M FUV w AKA at [facility] office.  And ZIO is approved

## 2024-08-08 ENCOUNTER — APPOINTMENT (OUTPATIENT)
Dept: CARDIOLOGY | Facility: CLINIC | Age: 59
End: 2024-08-08
Payer: COMMERCIAL

## 2024-08-20 ENCOUNTER — OFFICE VISIT (OUTPATIENT)
Dept: ORTHOPEDIC SURGERY | Facility: CLINIC | Age: 59
End: 2024-08-20
Payer: COMMERCIAL

## 2024-08-20 DIAGNOSIS — M25.511 BILATERAL SHOULDER PAIN, UNSPECIFIED CHRONICITY: Primary | ICD-10-CM

## 2024-08-20 DIAGNOSIS — M25.512 BILATERAL SHOULDER PAIN, UNSPECIFIED CHRONICITY: Primary | ICD-10-CM

## 2024-08-20 PROCEDURE — 20610 DRAIN/INJ JOINT/BURSA W/O US: CPT | Mod: 50 | Performed by: ORTHOPAEDIC SURGERY

## 2024-08-20 PROCEDURE — 2500000005 HC RX 250 GENERAL PHARMACY W/O HCPCS: Performed by: ORTHOPAEDIC SURGERY

## 2024-08-20 PROCEDURE — 99211 OFF/OP EST MAY X REQ PHY/QHP: CPT | Performed by: ORTHOPAEDIC SURGERY

## 2024-08-20 PROCEDURE — 1036F TOBACCO NON-USER: CPT | Performed by: ORTHOPAEDIC SURGERY

## 2024-08-20 PROCEDURE — 2500000004 HC RX 250 GENERAL PHARMACY W/ HCPCS (ALT 636 FOR OP/ED): Performed by: ORTHOPAEDIC SURGERY

## 2024-08-20 RX ORDER — LIDOCAINE HYDROCHLORIDE 10 MG/ML
2 INJECTION INFILTRATION; PERINEURAL
Status: COMPLETED | OUTPATIENT
Start: 2024-08-20 | End: 2024-08-20

## 2024-08-20 RX ORDER — TRIAMCINOLONE ACETONIDE 40 MG/ML
40 INJECTION, SUSPENSION INTRA-ARTICULAR; INTRAMUSCULAR
Status: COMPLETED | OUTPATIENT
Start: 2024-08-20 | End: 2024-08-20

## 2024-08-20 NOTE — PROGRESS NOTES
History of Present Illness  Patient returns today for injections with corticosteroid. The patient endorsing shoulder pain refractory to activity modifications and oral medications.  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 See procedure note - AC joint    Injection performed as detailed in the procedure note below.       L Inj/Asp: bilateral subacromial bursa on 8/20/2024 4:42 PM  Indications: pain  Details: 22 G needle, posterior approach  Medications (Right): 2 mL lidocaine 10 mg/mL (1 %); 40 mg triamcinolone acetonide 40 mg/mL  Medications (Left): 2 mL lidocaine 10 mg/mL (1 %); 40 mg triamcinolone acetonide 40 mg/mL  Outcome: tolerated well, no immediate complications  Procedure, treatment alternatives, risks and benefits explained, specific risks discussed. Consent was given by the patient. Immediately prior to procedure a time out was called to verify the correct patient, procedure, equipment, support staff and site/side marked as required. Patient was prepped and draped in the usual sterile fashion.

## 2024-08-30 ENCOUNTER — APPOINTMENT (OUTPATIENT)
Dept: CARDIOLOGY | Facility: CLINIC | Age: 59
End: 2024-08-30
Payer: COMMERCIAL

## 2024-10-19 DIAGNOSIS — E78.5 HYPERLIPIDEMIA, UNSPECIFIED HYPERLIPIDEMIA TYPE: ICD-10-CM

## 2024-10-19 DIAGNOSIS — E78.2 MIXED HYPERLIPIDEMIA: ICD-10-CM

## 2024-10-19 DIAGNOSIS — I10 ESSENTIAL (PRIMARY) HYPERTENSION: ICD-10-CM

## 2024-10-23 NOTE — TELEPHONE ENCOUNTER
Left vm message requesting call back to office.  Is PT currently on gemfibrozil 600 mg daily or is he no longer on this medication?

## 2024-10-25 RX ORDER — PRAVASTATIN SODIUM 20 MG/1
20 TABLET ORAL DAILY
Qty: 90 TABLET | Refills: 1 | Status: SHIPPED | OUTPATIENT
Start: 2024-10-25

## 2024-10-25 RX ORDER — LISINOPRIL 10 MG/1
10 TABLET ORAL DAILY
Qty: 90 TABLET | Refills: 1 | Status: SHIPPED | OUTPATIENT
Start: 2024-10-25

## 2024-10-25 RX ORDER — GEMFIBROZIL 600 MG/1
600 TABLET, FILM COATED ORAL DAILY
Refills: 0 | OUTPATIENT
Start: 2024-10-25

## 2024-11-17 DIAGNOSIS — E78.2 MIXED HYPERLIPIDEMIA: ICD-10-CM

## 2024-11-20 NOTE — TELEPHONE ENCOUNTER
Left vm message requesting return call to office:  The office received an automated refill request for gemfibrozil. It is noted that this was discontinued.  Please verify if PT is/is not taking medication.

## 2024-11-21 RX ORDER — GEMFIBROZIL 600 MG/1
600 TABLET, FILM COATED ORAL DAILY
Qty: 90 TABLET | Refills: 1 | Status: SHIPPED | OUTPATIENT
Start: 2024-11-21

## 2025-01-19 DIAGNOSIS — I10 ESSENTIAL (PRIMARY) HYPERTENSION: ICD-10-CM

## 2025-01-20 NOTE — TELEPHONE ENCOUNTER
Received request for prescription refills for patient.   Patient follows with Dr. Giles    Request is for Metoprolol Tartrate 50 mg  Is patient currently on medication yes    Last OV 07/25/2024  Next OV none    Pended for signing and sent to provider

## 2025-01-21 RX ORDER — METOPROLOL TARTRATE 50 MG/1
TABLET ORAL
Qty: 90 TABLET | Refills: 0 | Status: SHIPPED | OUTPATIENT
Start: 2025-01-21 | End: 2025-01-23 | Stop reason: SDUPTHER

## 2025-01-23 ENCOUNTER — OFFICE VISIT (OUTPATIENT)
Dept: CARDIOLOGY | Facility: CLINIC | Age: 60
End: 2025-01-23
Payer: COMMERCIAL

## 2025-01-23 VITALS
WEIGHT: 182.2 LBS | BODY MASS INDEX: 25.51 KG/M2 | SYSTOLIC BLOOD PRESSURE: 124 MMHG | HEART RATE: 52 BPM | HEIGHT: 71 IN | DIASTOLIC BLOOD PRESSURE: 70 MMHG

## 2025-01-23 DIAGNOSIS — I48.0 PAROXYSMAL ATRIAL FIBRILLATION (MULTI): ICD-10-CM

## 2025-01-23 DIAGNOSIS — Z78.9 NEVER SMOKED CIGARETTES: ICD-10-CM

## 2025-01-23 DIAGNOSIS — I50.9 HEART FAILURE, UNSPECIFIED: ICD-10-CM

## 2025-01-23 DIAGNOSIS — E78.5 HYPERLIPIDEMIA, UNSPECIFIED HYPERLIPIDEMIA TYPE: ICD-10-CM

## 2025-01-23 DIAGNOSIS — E78.2 MIXED HYPERLIPIDEMIA: ICD-10-CM

## 2025-01-23 DIAGNOSIS — I50.9 CONGESTIVE HEART FAILURE, UNSPECIFIED HF CHRONICITY, UNSPECIFIED HEART FAILURE TYPE: ICD-10-CM

## 2025-01-23 DIAGNOSIS — I10 ESSENTIAL HYPERTENSION: ICD-10-CM

## 2025-01-23 DIAGNOSIS — I10 ESSENTIAL (PRIMARY) HYPERTENSION: ICD-10-CM

## 2025-01-23 PROCEDURE — 3008F BODY MASS INDEX DOCD: CPT | Performed by: INTERNAL MEDICINE

## 2025-01-23 PROCEDURE — 93000 ELECTROCARDIOGRAM COMPLETE: CPT | Performed by: INTERNAL MEDICINE

## 2025-01-23 PROCEDURE — 3074F SYST BP LT 130 MM HG: CPT | Performed by: INTERNAL MEDICINE

## 2025-01-23 PROCEDURE — 1036F TOBACCO NON-USER: CPT | Performed by: INTERNAL MEDICINE

## 2025-01-23 PROCEDURE — 99214 OFFICE O/P EST MOD 30 MIN: CPT | Performed by: INTERNAL MEDICINE

## 2025-01-23 PROCEDURE — 3078F DIAST BP <80 MM HG: CPT | Performed by: INTERNAL MEDICINE

## 2025-01-23 RX ORDER — METOPROLOL TARTRATE 50 MG/1
50 TABLET ORAL 3 TIMES DAILY
Qty: 270 TABLET | Refills: 1 | Status: SHIPPED | OUTPATIENT
Start: 2025-01-23 | End: 2025-07-22

## 2025-01-23 RX ORDER — DILTIAZEM HYDROCHLORIDE 360 MG/1
360 CAPSULE, EXTENDED RELEASE ORAL DAILY
Qty: 90 CAPSULE | Refills: 1 | Status: SHIPPED | OUTPATIENT
Start: 2025-01-23

## 2025-01-23 RX ORDER — PRAVASTATIN SODIUM 20 MG/1
20 TABLET ORAL DAILY
Qty: 90 TABLET | Refills: 1 | Status: SHIPPED | OUTPATIENT
Start: 2025-01-23

## 2025-01-23 RX ORDER — FLECAINIDE ACETATE 150 MG/1
75 TABLET ORAL 2 TIMES DAILY
Qty: 90 TABLET | Refills: 1 | Status: SHIPPED | OUTPATIENT
Start: 2025-01-23 | End: 2025-07-22

## 2025-01-23 RX ORDER — LISINOPRIL 10 MG/1
10 TABLET ORAL DAILY
Qty: 90 TABLET | Refills: 1 | Status: SHIPPED | OUTPATIENT
Start: 2025-01-23

## 2025-01-23 RX ORDER — NAPROXEN SODIUM 220 MG/1
81 TABLET, FILM COATED ORAL DAILY
Qty: 90 TABLET | Refills: 1 | Status: SHIPPED | OUTPATIENT
Start: 2025-01-23 | End: 2025-07-22

## 2025-01-23 RX ORDER — GEMFIBROZIL 600 MG/1
600 TABLET, FILM COATED ORAL DAILY
Qty: 90 TABLET | Refills: 1 | Status: SHIPPED | OUTPATIENT
Start: 2025-01-23

## 2025-01-23 RX ORDER — FLECAINIDE ACETATE 100 MG/1
100 TABLET ORAL 2 TIMES DAILY
Qty: 180 TABLET | Refills: 1 | Status: CANCELLED | OUTPATIENT
Start: 2025-01-23

## 2025-01-23 ASSESSMENT — ENCOUNTER SYMPTOMS
SHORTNESS OF BREATH: 0
CONSTITUTIONAL NEGATIVE: 1
CARDIOVASCULAR NEGATIVE: 1
NEUROLOGICAL NEGATIVE: 1
RESPIRATORY NEGATIVE: 1

## 2025-01-23 NOTE — PROGRESS NOTES
CARDIOLOGY OFFICE VISIT      CHIEF COMPLAINT  Chief Complaint   Patient presents with    Follow-up        HISTORY OF PRESENT ILLNESS  Usman Cota is a 59 y.o. year old male patient with a history of hypertension, paroxysmal A-fib and chronic right bundle branch block who is on flecainide for maintenance of sinus rhythm.  Last echo shows normal LV function with no gross valvular abnormalities.      He said he has been doing well with no recurrent palpitations since the last time that I saw him.  He denied orthopnea proximal nocturnal dyspnea.  EKG today shows sinus bradycardia with right bundle branch block which is unchanged from his prior EKG.    ASSESSMENT AND PLAN  1.  Paroxysmal A-fib: Patient is currently in sinus rhythm as evidenced by his EKG today.  I had a discussion with the patient regarding referral for possible A-fib ablation but he has declined at this time.  He wishes to continue with medical therapy.  We will try and reduce his flecainide to 75 mg twice a day and reevaluate in about 2 months with repeat EKG..  2.  Hypertension: Blood pressure is fairly well-controlled current medications which we will continue.  3.  Chronic right bundle branch block: This is stable and asymptomatic.  4.  Dyslipidemia: With improved lipid profile, continue with current lipid-lowering therapy.    Problem List Items Addressed This Visit             ICD-10-CM       Cardiac and Vasculature    Essential hypertension I10    Relevant Orders    ECG 12 lead (Clinic Performed)    Mixed hyperlipidemia E78.2    Relevant Medications    gemfibrozil (Lopid) 600 mg tablet    Other Relevant Orders    Lipid panel    Paroxysmal atrial fibrillation (Multi) I48.0    Relevant Medications    metoprolol tartrate (Lopressor) 50 mg tablet    dilTIAZem CD (Cardizem CD) 360 mg 24 hr capsule    aspirin 81 mg chewable tablet    flecainide (Tambocor) 150 mg tablet    Congestive heart failure I50.9    Relevant Medications    metoprolol tartrate  (Lopressor) 50 mg tablet    dilTIAZem CD (Cardizem CD) 360 mg 24 hr capsule       Endocrine/Metabolic    BMI 25.0-25.9,adult Z68.25       Tobacco    Never smoked cigarettes Z78.9     Other Visit Diagnoses         Codes    Hyperlipidemia, unspecified hyperlipidemia type     E78.5    Relevant Medications    pravastatin (Pravachol) 20 mg tablet    Essential (primary) hypertension     I10    Relevant Medications    metoprolol tartrate (Lopressor) 50 mg tablet    lisinopril 10 mg tablet    dilTIAZem CD (Cardizem CD) 360 mg 24 hr capsule    Heart failure, unspecified     I50.9    Relevant Medications    metoprolol tartrate (Lopressor) 50 mg tablet    dilTIAZem CD (Cardizem CD) 360 mg 24 hr capsule            Recent Cardiovascular Testing:    Echo-  Stress-  Cath-  Carotid Ultrasound-    Past Medical History  Past Medical History:   Diagnosis Date    Abdominal distension (gaseous)     Abdominal bloating    Body mass index (BMI) 26.0-26.9, adult 01/27/2022    BMI 26.0-26.9,adult    Overweight     Overweight    Personal history of other specified conditions     History of diarrhea    Personal history of other specified conditions     History of disease    Personal history of other specified conditions     History of chest pain    Right upper quadrant pain     Chronic RUQ pain       Social History  Social History     Tobacco Use    Smoking status: Never    Smokeless tobacco: Never   Substance Use Topics    Alcohol use: Yes     Alcohol/week: 14.0 standard drinks of alcohol     Types: 14 Standard drinks or equivalent per week    Drug use: Yes     Types: Marijuana     Comment: 2-3 weekly       Family History     Family History   Problem Relation Name Age of Onset    No Known Problems Mother      Pancreatic cancer Father      Stroke Maternal Grandfather      Heart attack Paternal Grandfather          Allergies:  No Known Allergies     Outpatient Medications:  Current Outpatient Medications   Medication Instructions    aspirin 81  "mg, oral, Daily    dilTIAZem CD (CARDIZEM CD) 360 mg, oral, Daily    flecainide (TAMBOCOR) 75 mg, oral, 2 times daily    gemfibrozil (LOPID) 600 mg, oral, Daily    lisinopril 10 mg, oral, Daily    metoprolol tartrate (LOPRESSOR) 50 mg, oral, 3 times daily    pravastatin (PRAVACHOL) 20 mg, oral, Daily        Recent Lab Results:    CBC:   Lab Results   Component Value Date    WBC 9.6 07/25/2024    RBC 4.47 (L) 07/25/2024    HGB 14.9 07/25/2024    HCT 43.2 07/25/2024     07/25/2024        CMP:    Lab Results   Component Value Date     03/09/2024    K 4.6 03/09/2024     03/09/2024    CO2 29 03/09/2024    BUN 13 03/09/2024    CREATININE 0.95 03/09/2024    GLUCOSE 93 03/09/2024    CALCIUM 9.4 03/09/2024       Magnesium:    No results found for: \"MG\"    Lipid Profile:    Lab Results   Component Value Date    TRIG 103 03/09/2024    HDL 74.1 03/09/2024    LDLCALC 100 (H) 03/09/2024       TSH:    Lab Results   Component Value Date    TSH 2.61 02/08/2022       BNP:   Lab Results   Component Value Date    BNP 59 03/09/2024        PT/INR:    No results found for: \"PROTIME\", \"INR\"    HgBA1c:    No results found for: \"HGBA1C\"    BMP:  Lab Results   Component Value Date     03/09/2024     06/10/2023     03/11/2023     02/08/2022    K 4.6 03/09/2024    K 4.5 06/10/2023    K 4.3 03/11/2023    K 4.1 02/08/2022     03/09/2024     06/10/2023    CL 99 03/11/2023     02/08/2022    CO2 29 03/09/2024    CO2 29 06/10/2023    CO2 30 03/11/2023    CO2 28 02/08/2022    BUN 13 03/09/2024    BUN 19 06/10/2023    BUN 13 03/11/2023    BUN 12 02/08/2022    CREATININE 0.95 03/09/2024    CREATININE 0.98 06/10/2023    CREATININE 0.87 03/11/2023    CREATININE 0.76 02/08/2022       CBC:  Lab Results   Component Value Date    WBC 9.6 07/25/2024    WBC 7.1 06/10/2023    WBC 6.8 02/08/2022    WBC 7.4 05/21/2019    RBC 4.47 (L) 07/25/2024    RBC 4.55 06/10/2023    RBC 4.66 02/08/2022    RBC 4.19 " "(L) 05/21/2019    HGB 14.9 07/25/2024    HGB 14.7 06/10/2023    HGB 14.5 02/08/2022    HGB 13.9 05/21/2019    HCT 43.2 07/25/2024    HCT 44.8 06/10/2023    HCT 46.2 02/08/2022    HCT 41.9 05/21/2019    MCV 97 07/25/2024    MCV 98 06/10/2023    MCV 99 02/08/2022     05/21/2019    MCH 33.3 07/25/2024    MCHC 34.5 07/25/2024    MCHC 32.8 06/10/2023    MCHC 31.4 (L) 02/08/2022    MCHC 33.2 05/21/2019    RDW 12.3 07/25/2024    RDW 12.5 06/10/2023    RDW 12.8 02/08/2022    RDW 12.3 05/21/2019     07/25/2024     06/10/2023     02/08/2022     05/21/2019       Cardiac Enzymes:    No results found for: \"TROPHS\"    Hepatic Function Panel:    Lab Results   Component Value Date    ALKPHOS 33 03/09/2024    ALT 16 03/09/2024    AST 15 03/09/2024    PROT 6.8 03/09/2024    BILITOT 0.6 03/09/2024    BILIDIR 0.1 04/16/2021         REVIEW OF SYSTEMS  Review of Systems   Constitutional: Negative.   Cardiovascular: Negative.  Negative for chest pain.   Respiratory: Negative.  Negative for shortness of breath.    Neurological: Negative.        VITALS  Vitals:    01/23/25 1527   BP: 124/70   Pulse: 52     Wt Readings from Last 4 Encounters:   01/23/25 82.6 kg (182 lb 3.2 oz)   07/25/24 85.3 kg (188 lb)   07/01/24 86.2 kg (190 lb)   03/20/24 86.2 kg (190 lb)       PHYSICAL EXAM  Constitutional:       Appearance: Healthy appearance.      Comments: overweight   Neck:      Thyroid: Thyroid normal.      Vascular: JVD normal.   Pulmonary:      Effort: Pulmonary effort is normal.      Breath sounds: Normal breath sounds.   Cardiovascular:      Normal rate. Regular rhythm. Normal S1. Normal S2.       Murmurs: There is no murmur.   Edema:     Peripheral edema absent.   Musculoskeletal: Normal range of motion.      Cervical back: Normal range of motion. Skin:     General: Skin is warm and dry.   Neurological:      General: No focal deficit present.      Mental Status: Alert and oriented to person, place and time. "         Scribe Attestation  By signing my name below, I, Diomedes Giles MD  , Scribe   attest that this documentation has been prepared under the direction and in the presence of Diomedes Giles MD.

## 2025-02-20 ENCOUNTER — APPOINTMENT (OUTPATIENT)
Dept: CARDIOLOGY | Facility: CLINIC | Age: 60
End: 2025-02-20
Payer: COMMERCIAL

## 2025-02-20 VITALS
HEIGHT: 71 IN | SYSTOLIC BLOOD PRESSURE: 110 MMHG | WEIGHT: 190.4 LBS | BODY MASS INDEX: 26.65 KG/M2 | HEART RATE: 59 BPM | DIASTOLIC BLOOD PRESSURE: 70 MMHG

## 2025-02-20 DIAGNOSIS — I10 ESSENTIAL HYPERTENSION: ICD-10-CM

## 2025-02-20 DIAGNOSIS — E78.1 ESSENTIAL HYPERTRIGLYCERIDEMIA: ICD-10-CM

## 2025-02-20 DIAGNOSIS — E78.2 MIXED HYPERLIPIDEMIA: ICD-10-CM

## 2025-02-20 DIAGNOSIS — I45.10 RIGHT BUNDLE BRANCH BLOCK (RBBB): ICD-10-CM

## 2025-02-20 DIAGNOSIS — I48.0 PAROXYSMAL ATRIAL FIBRILLATION (MULTI): ICD-10-CM

## 2025-02-20 PROCEDURE — 3074F SYST BP LT 130 MM HG: CPT | Performed by: INTERNAL MEDICINE

## 2025-02-20 PROCEDURE — 3008F BODY MASS INDEX DOCD: CPT | Performed by: INTERNAL MEDICINE

## 2025-02-20 PROCEDURE — 1036F TOBACCO NON-USER: CPT | Performed by: INTERNAL MEDICINE

## 2025-02-20 PROCEDURE — 3078F DIAST BP <80 MM HG: CPT | Performed by: INTERNAL MEDICINE

## 2025-02-20 PROCEDURE — 93000 ELECTROCARDIOGRAM COMPLETE: CPT | Performed by: INTERNAL MEDICINE

## 2025-02-20 PROCEDURE — 99213 OFFICE O/P EST LOW 20 MIN: CPT | Performed by: INTERNAL MEDICINE

## 2025-02-20 ASSESSMENT — ENCOUNTER SYMPTOMS
SHORTNESS OF BREATH: 0
RESPIRATORY NEGATIVE: 1
NEUROLOGICAL NEGATIVE: 1
CONSTITUTIONAL NEGATIVE: 1
CARDIOVASCULAR NEGATIVE: 1

## 2025-02-20 NOTE — PROGRESS NOTES
CARDIOLOGY OFFICE VISIT      CHIEF COMPLAINT  Chief Complaint   Patient presents with    Follow-up        HISTORY OF PRESENT ILLNESS  Usman Cota is a 59 y.o. year old male patient with a history of hypertension, paroxysmal A-fib and chronic right bundle branch block who is on flecainide for maintenance of sinus rhythm.  Last echo shows normal LV function with no gross valvular abnormalities.      He said he has been doing well with no recurrent palpitations since his last visit when we reduced his flecainide to 75 mg twice a day down from 100 mg twice a day.      EKG today shows sinus bradycardia at 51 bpm with right bundle branch block which is unchanged from his prior EKG.    ASSESSMENT AND PLAN  1.  Paroxysmal A-fib: Patient is currently in sinus rhythm as evidenced by his EKG today.  I had a discussion with the patient regarding referral for possible A-fib ablation but he has declined at this time.  He wishes to continue with medical therapy.  He has continued to maintain sinus rhythm on his current dose of flecainide which we will continue.  He is not on aspirin for anticoagulation because of a low [id number] score.  2.  Hypertension: Blood pressure is fairly well-controlled current medications which we will continue.  3.  Chronic right bundle branch block: This is stable and asymptomatic.  4.  Dyslipidemia: With improved lipid profile, continue with current lipid-lowering therapy.    Problem List Items Addressed This Visit             ICD-10-CM       Cardiac and Vasculature    Essential hypertension I10    Essential hypertriglyceridemia E78.1    Mixed hyperlipidemia E78.2    Relevant Orders    Lipid panel    Paroxysmal atrial fibrillation (Multi) I48.0    Relevant Orders    ECG 12 lead (Clinic Performed)    Comprehensive metabolic panel    Right bundle branch block (RBBB) I45.10       Endocrine/Metabolic    BMI 25.0-25.9,adult Z68.25       Recent Cardiovascular Testing:    Echo-  Stress-  Cath-  Carotid  Ultrasound-    Past Medical History  Past Medical History:   Diagnosis Date    Abdominal distension (gaseous)     Abdominal bloating    Body mass index (BMI) 26.0-26.9, adult 01/27/2022    BMI 26.0-26.9,adult    Overweight     Overweight    Personal history of other specified conditions     History of diarrhea    Personal history of other specified conditions     History of disease    Personal history of other specified conditions     History of chest pain    Right upper quadrant pain     Chronic RUQ pain       Social History  Social History     Tobacco Use    Smoking status: Never    Smokeless tobacco: Never   Substance Use Topics    Alcohol use: Yes     Alcohol/week: 14.0 standard drinks of alcohol     Types: 14 Standard drinks or equivalent per week    Drug use: Yes     Types: Marijuana     Comment: 2-3 weekly       Family History     Family History   Problem Relation Name Age of Onset    No Known Problems Mother      Pancreatic cancer Father      Stroke Maternal Grandfather      Heart attack Paternal Grandfather          Allergies:  No Known Allergies     Outpatient Medications:  Current Outpatient Medications   Medication Instructions    aspirin 81 mg, oral, Daily    dilTIAZem CD (CARDIZEM CD) 360 mg, oral, Daily    flecainide (TAMBOCOR) 75 mg, oral, 2 times daily    gemfibrozil (LOPID) 600 mg, oral, Daily    lisinopril 10 mg, oral, Daily    metoprolol tartrate (LOPRESSOR) 50 mg, oral, 3 times daily    pravastatin (PRAVACHOL) 20 mg, oral, Daily        Recent Lab Results:    CBC:   Lab Results   Component Value Date    WBC 9.6 07/25/2024    RBC 4.47 (L) 07/25/2024    HGB 14.9 07/25/2024    HCT 43.2 07/25/2024     07/25/2024        CMP:    Lab Results   Component Value Date     03/09/2024    K 4.6 03/09/2024     03/09/2024    CO2 29 03/09/2024    BUN 13 03/09/2024    CREATININE 0.95 03/09/2024    GLUCOSE 93 03/09/2024    CALCIUM 9.4 03/09/2024       Magnesium:    No results found for:  "\"MG\"    Lipid Profile:    Lab Results   Component Value Date    TRIG 103 03/09/2024    HDL 74.1 03/09/2024    LDLCALC 100 (H) 03/09/2024       TSH:    Lab Results   Component Value Date    TSH 2.61 02/08/2022       BNP:   Lab Results   Component Value Date    BNP 59 03/09/2024        PT/INR:    No results found for: \"PROTIME\", \"INR\"    HgBA1c:    No results found for: \"HGBA1C\"    BMP:  Lab Results   Component Value Date     03/09/2024     06/10/2023     03/11/2023     02/08/2022    K 4.6 03/09/2024    K 4.5 06/10/2023    K 4.3 03/11/2023    K 4.1 02/08/2022     03/09/2024     06/10/2023    CL 99 03/11/2023     02/08/2022    CO2 29 03/09/2024    CO2 29 06/10/2023    CO2 30 03/11/2023    CO2 28 02/08/2022    BUN 13 03/09/2024    BUN 19 06/10/2023    BUN 13 03/11/2023    BUN 12 02/08/2022    CREATININE 0.95 03/09/2024    CREATININE 0.98 06/10/2023    CREATININE 0.87 03/11/2023    CREATININE 0.76 02/08/2022       CBC:  Lab Results   Component Value Date    WBC 9.6 07/25/2024    WBC 7.1 06/10/2023    WBC 6.8 02/08/2022    WBC 7.4 05/21/2019    RBC 4.47 (L) 07/25/2024    RBC 4.55 06/10/2023    RBC 4.66 02/08/2022    RBC 4.19 (L) 05/21/2019    HGB 14.9 07/25/2024    HGB 14.7 06/10/2023    HGB 14.5 02/08/2022    HGB 13.9 05/21/2019    HCT 43.2 07/25/2024    HCT 44.8 06/10/2023    HCT 46.2 02/08/2022    HCT 41.9 05/21/2019    MCV 97 07/25/2024    MCV 98 06/10/2023    MCV 99 02/08/2022     05/21/2019    MCH 33.3 07/25/2024    MCHC 34.5 07/25/2024    MCHC 32.8 06/10/2023    MCHC 31.4 (L) 02/08/2022    MCHC 33.2 05/21/2019    RDW 12.3 07/25/2024    RDW 12.5 06/10/2023    RDW 12.8 02/08/2022    RDW 12.3 05/21/2019     07/25/2024     06/10/2023     02/08/2022     05/21/2019       Cardiac Enzymes:    No results found for: \"TROPHS\"    Hepatic Function Panel:    Lab Results   Component Value Date    ALKPHOS 33 03/09/2024    ALT 16 03/09/2024    AST 15 " 03/09/2024    PROT 6.8 03/09/2024    BILITOT 0.6 03/09/2024    BILIDIR 0.1 04/16/2021         REVIEW OF SYSTEMS  Review of Systems   Constitutional: Negative.   Cardiovascular: Negative.  Negative for chest pain.   Respiratory: Negative.  Negative for shortness of breath.    Neurological: Negative.        VITALS  Vitals:    02/20/25 1546   BP: 110/70   Pulse: 59     Wt Readings from Last 4 Encounters:   02/20/25 86.4 kg (190 lb 6.4 oz)   01/23/25 82.6 kg (182 lb 3.2 oz)   07/25/24 85.3 kg (188 lb)   07/01/24 86.2 kg (190 lb)       PHYSICAL EXAM  Constitutional:       Appearance: Healthy appearance.      Comments: overweight   Neck:      Thyroid: Thyroid normal.      Vascular: JVD normal.   Pulmonary:      Effort: Pulmonary effort is normal.      Breath sounds: Normal breath sounds.   Cardiovascular:      Normal rate. Regular rhythm. Normal S1. Normal S2.       Murmurs: There is no murmur.   Edema:     Peripheral edema absent.   Musculoskeletal: Normal range of motion.      Cervical back: Normal range of motion. Skin:     General: Skin is warm and dry.   Neurological:      General: No focal deficit present.      Mental Status: Alert and oriented to person, place and time.         Scribe Attestation  By signing my name below, I, Diomedes Giles MD  , Scribe   attest that this documentation has been prepared under the direction and in the presence of Diomedes Giles MD.

## 2025-02-20 NOTE — PATIENT INSTRUCTIONS
Lipid panel  Continue same medications and treatments.   Patient educated on proper medication use.   Patient educated on risk factor modification.   Please bring any lab results from other providers / physicians to your next appointment.     Please bring all medicines, vitamins, and herbal supplements with you when you come to the office.     Prescriptions will not be filled unless you are compliant with your follow up appointments or have a follow up appointment scheduled as per instruction of your physician. Refills should be requested at the time of your visit.  6 month visit

## 2025-02-23 LAB
ALBUMIN SERPL-MCNC: 4.7 G/DL (ref 3.6–5.1)
ALP SERPL-CCNC: 44 U/L (ref 35–144)
ALT SERPL-CCNC: 13 U/L (ref 9–46)
ANION GAP SERPL CALCULATED.4IONS-SCNC: 7 MMOL/L (CALC) (ref 7–17)
AST SERPL-CCNC: 15 U/L (ref 10–35)
BILIRUB SERPL-MCNC: 0.8 MG/DL (ref 0.2–1.2)
BUN SERPL-MCNC: 11 MG/DL (ref 7–25)
CALCIUM SERPL-MCNC: 9.6 MG/DL (ref 8.6–10.3)
CHLORIDE SERPL-SCNC: 97 MMOL/L (ref 98–110)
CHOLEST SERPL-MCNC: 178 MG/DL
CHOLEST/HDLC SERPL: 2.5 (CALC)
CO2 SERPL-SCNC: 29 MMOL/L (ref 20–32)
CREAT SERPL-MCNC: 0.74 MG/DL (ref 0.7–1.35)
EGFRCR SERPLBLD CKD-EPI 2021: 103 ML/MIN/1.73M2
GLUCOSE SERPL-MCNC: 93 MG/DL (ref 65–99)
HDLC SERPL-MCNC: 70 MG/DL
LDLC SERPL CALC-MCNC: 89 MG/DL (CALC)
NONHDLC SERPL-MCNC: 108 MG/DL (CALC)
POTASSIUM SERPL-SCNC: 4.8 MMOL/L (ref 3.5–5.3)
PROT SERPL-MCNC: 7.3 G/DL (ref 6.1–8.1)
SODIUM SERPL-SCNC: 133 MMOL/L (ref 135–146)
TRIGL SERPL-MCNC: 94 MG/DL

## 2025-02-25 ENCOUNTER — TELEPHONE (OUTPATIENT)
Dept: CARDIOLOGY | Facility: CLINIC | Age: 60
End: 2025-02-25
Payer: COMMERCIAL

## 2025-02-25 NOTE — TELEPHONE ENCOUNTER
----- Message from Diomedes Giles sent at 2/25/2025 12:39 PM EST -----  Lipids and CMP within normal limits.  Continue current treatment

## 2025-02-25 NOTE — TELEPHONE ENCOUNTER
I called and spoke with patient wife. Gave results of CMP and Lipid. Sodium is a little on low side. I will send to PCP Dr Stahl for review.

## 2025-02-27 ENCOUNTER — TELEPHONE (OUTPATIENT)
Dept: PRIMARY CARE | Facility: CLINIC | Age: 60
End: 2025-02-27
Payer: COMMERCIAL

## 2025-02-27 NOTE — TELEPHONE ENCOUNTER
----- Message from Brian Stahl sent at 2/26/2025  8:11 PM EST -----  Sodium sl low--recheck labs 2 weeks  ----- Message -----  From: Lito Moya LPN  Sent: 2/25/2025   3:23 PM EST  To: MD Dr Alec Licea reviewed labs but patient wife wanted you to see them. Thanks NELSON Morse  ----- Message -----  From: Diomedes Giles MD  Sent: 2/25/2025  12:39 PM EST  To: Lito Moya LPN    Lipids and CMP within normal limits.  Continue current treatment

## 2025-05-21 DIAGNOSIS — E78.2 MIXED HYPERLIPIDEMIA: ICD-10-CM

## 2025-05-21 DIAGNOSIS — I10 ESSENTIAL (PRIMARY) HYPERTENSION: ICD-10-CM

## 2025-05-29 RX ORDER — DILTIAZEM HYDROCHLORIDE 360 MG/1
360 CAPSULE, EXTENDED RELEASE ORAL DAILY
Qty: 90 CAPSULE | Refills: 3 | Status: SHIPPED | OUTPATIENT
Start: 2025-05-29

## 2025-05-29 RX ORDER — GEMFIBROZIL 600 MG/1
600 TABLET, FILM COATED ORAL DAILY
Qty: 90 TABLET | Refills: 3 | Status: SHIPPED | OUTPATIENT
Start: 2025-05-29

## 2025-07-02 ENCOUNTER — APPOINTMENT (OUTPATIENT)
Dept: PRIMARY CARE | Facility: CLINIC | Age: 60
End: 2025-07-02
Payer: COMMERCIAL

## 2025-07-02 VITALS
TEMPERATURE: 98.2 F | HEART RATE: 64 BPM | WEIGHT: 187.4 LBS | HEIGHT: 71 IN | BODY MASS INDEX: 26.23 KG/M2 | SYSTOLIC BLOOD PRESSURE: 137 MMHG | DIASTOLIC BLOOD PRESSURE: 86 MMHG

## 2025-07-02 DIAGNOSIS — E78.2 MIXED HYPERLIPIDEMIA: ICD-10-CM

## 2025-07-02 DIAGNOSIS — Z78.9 NEVER SMOKED CIGARETTES: ICD-10-CM

## 2025-07-02 DIAGNOSIS — R35.1 NOCTURIA: ICD-10-CM

## 2025-07-02 DIAGNOSIS — Z00.00 ANNUAL PHYSICAL EXAM: ICD-10-CM

## 2025-07-02 DIAGNOSIS — Z12.5 PROSTATE CANCER SCREENING: ICD-10-CM

## 2025-07-02 DIAGNOSIS — E87.1 HYPONATREMIA: ICD-10-CM

## 2025-07-02 DIAGNOSIS — E66.3 OVERWEIGHT WITH BODY MASS INDEX (BMI) OF 26 TO 26.9 IN ADULT: ICD-10-CM

## 2025-07-02 DIAGNOSIS — I10 ESSENTIAL HYPERTENSION: ICD-10-CM

## 2025-07-02 PROCEDURE — 99396 PREV VISIT EST AGE 40-64: CPT | Performed by: FAMILY MEDICINE

## 2025-07-02 PROCEDURE — 1036F TOBACCO NON-USER: CPT | Performed by: FAMILY MEDICINE

## 2025-07-02 PROCEDURE — 3008F BODY MASS INDEX DOCD: CPT | Performed by: FAMILY MEDICINE

## 2025-07-02 PROCEDURE — 3079F DIAST BP 80-89 MM HG: CPT | Performed by: FAMILY MEDICINE

## 2025-07-02 PROCEDURE — 3075F SYST BP GE 130 - 139MM HG: CPT | Performed by: FAMILY MEDICINE

## 2025-07-02 ASSESSMENT — ENCOUNTER SYMPTOMS
MUSCULOSKELETAL NEGATIVE: 1
CARDIOVASCULAR NEGATIVE: 1
CONSTITUTIONAL NEGATIVE: 1
HEMATOLOGIC/LYMPHATIC NEGATIVE: 1
RESPIRATORY NEGATIVE: 1
EYES NEGATIVE: 1
FREQUENCY: 1
ALLERGIC/IMMUNOLOGIC NEGATIVE: 1
GASTROINTESTINAL NEGATIVE: 1
ENDOCRINE NEGATIVE: 1
PSYCHIATRIC NEGATIVE: 1

## 2025-07-02 NOTE — PATIENT INSTRUCTIONS
Obtain labs  Recommend Shingrix vaccine at pharmacy  Recommend annual eye exam  Exercise regularly  Return 1 year

## 2025-07-02 NOTE — PROGRESS NOTES
Martha Anthony is here for an annual wellness visit.  He states that he is overall been feeling well.  He has no complaints.  He continues on his medications as noted.  He continues to see Dr. Giles for cardiology care regarding atrial fibrillation Controlled with medication as well as hypertension.  He is due for an eye exam.  He has never smoked except for occasional marijuana use.    Patient ID: Usman Cota is a 59 y.o. male who presents for Annual Exam (Physical):    Problem List Items Addressed This Visit    None     Medical History[1]   Surgical History[2]   Family History[3]   Social History     Socioeconomic History    Marital status:      Spouse name: Not on file    Number of children: Not on file    Years of education: Not on file    Highest education level: Not on file   Occupational History    Not on file   Tobacco Use    Smoking status: Never    Smokeless tobacco: Never   Substance and Sexual Activity    Alcohol use: Yes     Alcohol/week: 14.0 standard drinks of alcohol     Types: 14 Standard drinks or equivalent per week    Drug use: Yes     Types: Marijuana     Comment: 2-3 weekly    Sexual activity: Not on file   Other Topics Concern    Not on file   Social History Narrative    Not on file     Social Drivers of Health     Financial Resource Strain: Not on file   Food Insecurity: Not on file   Transportation Needs: Not on file   Physical Activity: Not on file   Stress: Not on file   Social Connections: Not on file   Intimate Partner Violence: Not on file   Housing Stability: Not on file      Patient has no known allergies.   Current Medications[4]    Immunization History   Administered Date(s) Administered    Pfizer Purple Cap SARS-CoV-2 03/23/2021, 04/17/2021, 11/23/2021        Review of Systems   Constitutional: Negative.    HENT: Negative.     Eyes: Negative.    Respiratory: Negative.     Cardiovascular: Negative.    Gastrointestinal: Negative.    Endocrine: Negative.    Genitourinary:   Positive for frequency (Patient has no daytime frequency but nocturia 1-2 times per night.).   Musculoskeletal: Negative.    Skin: Negative.    Allergic/Immunologic: Negative.    Hematological: Negative.    Psychiatric/Behavioral: Negative.     All other systems reviewed and are negative.       Vitals:    07/02/25 0939   BP: 137/86   Pulse: 64   Temp: 36.8 °C (98.2 °F)     Vitals:    07/02/25 0939   Weight: 85 kg (187 lb 6.4 oz)      Physical Exam  Constitutional:       General: He is not in acute distress.     Appearance: Normal appearance.   Neck:      Vascular: No carotid bruit.   Cardiovascular:      Rate and Rhythm: Normal rate and regular rhythm.      Pulses: Normal pulses.      Heart sounds: Normal heart sounds. No murmur heard.     No friction rub. No gallop.   Pulmonary:      Effort: Pulmonary effort is normal. No respiratory distress.      Breath sounds: Normal breath sounds. No wheezing or rales.   Abdominal:      General: Abdomen is flat. There is no distension.      Palpations: Abdomen is soft. There is no mass.      Tenderness: There is no abdominal tenderness. There is no guarding.   Musculoskeletal:      Cervical back: Neck supple.   Neurological:      General: No focal deficit present.      Mental Status: He is alert and oriented to person, place, and time. Mental status is at baseline.   Psychiatric:         Mood and Affect: Mood normal.         Thought Content: Thought content normal.         Judgment: Judgment normal.          ASSESSMENT/PLAN: Annual wellness visit.  Check CMP CBC and PSA.  Lipid profile up-to-date.  Cologuard will be due in 2026.  Recommended shingles vaccination update and complete eye exam.    Hypertension stable.  Continue current meds noted    Hyperlipidemia stable.  Continue gemfibrozil and pravastatin.    History of atrial fibrillation stable and followed by cardiology    Occasional nocturia.  We discussed his overall fluid intake.  Call if any worsening of nocturia  occurs.    Note mild hyponatremia on labs several months ago.  Repeat CMP at this point.    Follow-up in 1 year pending above and call as needed       Scribe Attestation  By signing my name below, I, Morena Xiao LPN, Scribe   attest that this documentation has been prepared under the direction and in the presence of Brian Stahl MD.         [1]   Past Medical History:  Diagnosis Date    Abdominal distension (gaseous)     Abdominal bloating    Body mass index (BMI) 26.0-26.9, adult 01/27/2022    BMI 26.0-26.9,adult    Overweight     Overweight    Personal history of other specified conditions     History of diarrhea    Personal history of other specified conditions     History of disease    Personal history of other specified conditions     History of chest pain    Right upper quadrant pain     Chronic RUQ pain   [2]   Past Surgical History:  Procedure Laterality Date    OTHER SURGICAL HISTORY  01/18/2022    Dental surgery   [3]   Family History  Problem Relation Name Age of Onset    No Known Problems Mother      Pancreatic cancer Father      Stroke Maternal Grandfather      Heart attack Paternal Grandfather     [4]   Current Outpatient Medications   Medication Sig Dispense Refill    aspirin 81 mg chewable tablet Chew 1 tablet (81 mg) once daily. 90 tablet 1    dilTIAZem CD (Cardizem CD) 360 mg 24 hr capsule TAKE 1 CAPSULE (360 MG) BY MOUTH ONCE DAILY. 90 capsule 3    flecainide (Tambocor) 150 mg tablet Take 0.5 tablets (75 mg) by mouth 2 times a day. 90 tablet 1    gemfibrozil (Lopid) 600 mg tablet TAKE 1 TABLET (600 MG) BY MOUTH ONCE DAILY 90 tablet 3    lisinopril 10 mg tablet Take 1 tablet (10 mg) by mouth once daily. 90 tablet 1    metoprolol tartrate (Lopressor) 50 mg tablet Take 1 tablet by mouth 3 times a day. 270 tablet 1    pravastatin (Pravachol) 20 mg tablet Take 1 tablet (20 mg) by mouth once daily. 90 tablet 1     No current facility-administered medications for this visit.

## 2025-07-03 ENCOUNTER — TELEPHONE (OUTPATIENT)
Dept: PRIMARY CARE | Facility: CLINIC | Age: 60
End: 2025-07-03
Payer: COMMERCIAL

## 2025-07-03 LAB
ALBUMIN SERPL-MCNC: 4.6 G/DL (ref 3.6–5.1)
ALP SERPL-CCNC: 40 U/L (ref 35–144)
ALT SERPL-CCNC: 22 U/L (ref 9–46)
ANION GAP SERPL CALCULATED.4IONS-SCNC: 7 MMOL/L (CALC) (ref 7–17)
AST SERPL-CCNC: 26 U/L (ref 10–35)
BILIRUB SERPL-MCNC: 0.5 MG/DL (ref 0.2–1.2)
BUN SERPL-MCNC: 12 MG/DL (ref 7–25)
CALCIUM SERPL-MCNC: 9.8 MG/DL (ref 8.6–10.3)
CHLORIDE SERPL-SCNC: 100 MMOL/L (ref 98–110)
CO2 SERPL-SCNC: 30 MMOL/L (ref 20–32)
CREAT SERPL-MCNC: 0.81 MG/DL (ref 0.7–1.35)
EGFRCR SERPLBLD CKD-EPI 2021: 100 ML/MIN/1.73M2
ERYTHROCYTE [DISTWIDTH] IN BLOOD BY AUTOMATED COUNT: 13.1 % (ref 11–15)
GLUCOSE SERPL-MCNC: 100 MG/DL (ref 65–99)
HCT VFR BLD AUTO: 46.4 % (ref 38.5–50)
HGB BLD-MCNC: 15.4 G/DL (ref 13.2–17.1)
MCH RBC QN AUTO: 32.8 PG (ref 27–33)
MCHC RBC AUTO-ENTMCNC: 33.2 G/DL (ref 32–36)
MCV RBC AUTO: 98.9 FL (ref 80–100)
PLATELET # BLD AUTO: 366 THOUSAND/UL (ref 140–400)
PMV BLD REES-ECKER: 10.1 FL (ref 7.5–12.5)
POTASSIUM SERPL-SCNC: 4.9 MMOL/L (ref 3.5–5.3)
PROT SERPL-MCNC: 7.1 G/DL (ref 6.1–8.1)
PSA SERPL-MCNC: 0.33 NG/ML
RBC # BLD AUTO: 4.69 MILLION/UL (ref 4.2–5.8)
SODIUM SERPL-SCNC: 137 MMOL/L (ref 135–146)
WBC # BLD AUTO: 7.6 THOUSAND/UL (ref 3.8–10.8)

## 2025-07-03 NOTE — TELEPHONE ENCOUNTER
----- Message from Brian Stahl sent at 7/3/2025 12:31 PM EDT -----  Labs normal.  Repeat sodium level normal  ----- Message -----  From: Dionisio [facility] Results In  Sent: 7/2/2025  11:19 PM EDT  To: Brian Stahl MD

## 2025-07-07 ENCOUNTER — APPOINTMENT (OUTPATIENT)
Dept: PRIMARY CARE | Facility: CLINIC | Age: 60
End: 2025-07-07
Payer: COMMERCIAL

## 2025-07-22 DIAGNOSIS — I48.0 PAROXYSMAL ATRIAL FIBRILLATION (MULTI): ICD-10-CM

## 2025-07-23 RX ORDER — FLECAINIDE ACETATE 150 MG/1
75 TABLET ORAL 2 TIMES DAILY
Qty: 90 TABLET | Refills: 1 | Status: SHIPPED | OUTPATIENT
Start: 2025-07-23

## 2025-08-14 ENCOUNTER — APPOINTMENT (OUTPATIENT)
Dept: CARDIOLOGY | Facility: CLINIC | Age: 60
End: 2025-08-14
Payer: COMMERCIAL

## 2025-08-14 VITALS
DIASTOLIC BLOOD PRESSURE: 76 MMHG | BODY MASS INDEX: 25.34 KG/M2 | HEART RATE: 63 BPM | HEIGHT: 71 IN | SYSTOLIC BLOOD PRESSURE: 121 MMHG | WEIGHT: 181 LBS

## 2025-08-14 DIAGNOSIS — E78.2 MIXED HYPERLIPIDEMIA: ICD-10-CM

## 2025-08-14 DIAGNOSIS — I45.10 RIGHT BUNDLE BRANCH BLOCK (RBBB): ICD-10-CM

## 2025-08-14 DIAGNOSIS — I10 ESSENTIAL HYPERTENSION: ICD-10-CM

## 2025-08-14 DIAGNOSIS — I48.0 PAROXYSMAL ATRIAL FIBRILLATION (MULTI): ICD-10-CM

## 2025-08-14 DIAGNOSIS — E78.1 ESSENTIAL HYPERTRIGLYCERIDEMIA: ICD-10-CM

## 2025-08-14 DIAGNOSIS — E66.3 OVERWEIGHT WITH BODY MASS INDEX (BMI) OF 26 TO 26.9 IN ADULT: ICD-10-CM

## 2025-08-14 DIAGNOSIS — Z78.9 NEVER SMOKED CIGARETTES: ICD-10-CM

## 2025-08-14 PROCEDURE — 99214 OFFICE O/P EST MOD 30 MIN: CPT | Performed by: INTERNAL MEDICINE

## 2025-08-14 PROCEDURE — 3078F DIAST BP <80 MM HG: CPT | Performed by: INTERNAL MEDICINE

## 2025-08-14 PROCEDURE — 3074F SYST BP LT 130 MM HG: CPT | Performed by: INTERNAL MEDICINE

## 2025-08-14 PROCEDURE — 3008F BODY MASS INDEX DOCD: CPT | Performed by: INTERNAL MEDICINE

## 2025-08-14 ASSESSMENT — ENCOUNTER SYMPTOMS
RESPIRATORY NEGATIVE: 1
NEUROLOGICAL NEGATIVE: 1
CONSTITUTIONAL NEGATIVE: 1
CARDIOVASCULAR NEGATIVE: 1

## 2025-08-18 ENCOUNTER — TELEPHONE (OUTPATIENT)
Dept: PRIMARY CARE | Facility: CLINIC | Age: 60
End: 2025-08-18
Payer: COMMERCIAL

## 2025-08-18 DIAGNOSIS — I10 ESSENTIAL (PRIMARY) HYPERTENSION: ICD-10-CM

## 2025-08-18 RX ORDER — METOPROLOL TARTRATE 50 MG/1
50 TABLET ORAL 2 TIMES DAILY
Qty: 180 TABLET | Refills: 1 | OUTPATIENT
Start: 2025-08-18 | End: 2026-02-14

## 2025-09-03 DIAGNOSIS — I10 ESSENTIAL (PRIMARY) HYPERTENSION: ICD-10-CM

## 2025-09-03 RX ORDER — METOPROLOL TARTRATE 50 MG/1
50 TABLET ORAL 2 TIMES DAILY
Qty: 180 TABLET | Refills: 1 | Status: SHIPPED | OUTPATIENT
Start: 2025-09-03 | End: 2026-03-02

## 2026-02-12 ENCOUNTER — APPOINTMENT (OUTPATIENT)
Dept: CARDIOLOGY | Facility: CLINIC | Age: 61
End: 2026-02-12
Payer: COMMERCIAL

## 2026-07-02 ENCOUNTER — APPOINTMENT (OUTPATIENT)
Dept: PRIMARY CARE | Facility: CLINIC | Age: 61
End: 2026-07-02
Payer: COMMERCIAL